# Patient Record
Sex: FEMALE | Race: BLACK OR AFRICAN AMERICAN | NOT HISPANIC OR LATINO | URBAN - METROPOLITAN AREA
[De-identification: names, ages, dates, MRNs, and addresses within clinical notes are randomized per-mention and may not be internally consistent; named-entity substitution may affect disease eponyms.]

---

## 2017-03-24 PROBLEM — D50.9 IRON DEFICIENCY ANEMIA: Status: ACTIVE | Noted: 2017-03-24

## 2017-03-28 ENCOUNTER — CONSULT (OUTPATIENT)
Dept: ONCOLOGY | Facility: CLINIC | Age: 37
End: 2017-03-28

## 2017-03-28 VITALS
BODY MASS INDEX: 28.77 KG/M2 | SYSTOLIC BLOOD PRESSURE: 108 MMHG | TEMPERATURE: 97.7 F | HEART RATE: 79 BPM | WEIGHT: 179 LBS | DIASTOLIC BLOOD PRESSURE: 69 MMHG | HEIGHT: 66 IN | RESPIRATION RATE: 16 BRPM

## 2017-03-28 DIAGNOSIS — D50.9 IRON DEFICIENCY ANEMIA, UNSPECIFIED IRON DEFICIENCY ANEMIA TYPE: Primary | ICD-10-CM

## 2017-03-28 PROBLEM — K90.9 MALABSORPTION OF IRON: Status: ACTIVE | Noted: 2017-03-28

## 2017-03-28 PROCEDURE — 99204 OFFICE O/P NEW MOD 45 MIN: CPT | Performed by: INTERNAL MEDICINE

## 2017-03-28 RX ORDER — HYDROCHLOROTHIAZIDE 25 MG/1
25 TABLET ORAL DAILY
COMMUNITY
End: 2022-04-21 | Stop reason: HOSPADM

## 2017-03-28 RX ORDER — LISINOPRIL 10 MG/1
10 TABLET ORAL DAILY
COMMUNITY
End: 2022-04-21 | Stop reason: HOSPADM

## 2017-03-28 NOTE — PROGRESS NOTES
Subjective     PROBLEM LIST:  1. Iron deficiency anemia  2. hyepertension  3. asthma    CHIEF COMPLAINT: iron deficiency anemia      HISTORY OF PRESENT ILLNESS:  The patient is a 36 y.o. year old female, referred for evaluation of iron deficiency.  She says she has been anemic for 20 years.  It was an issue with all 3 of her pregnancies.  However, recently it seems to be worsening.  She reports fatigue, craving ice, and weird food cravings.  In 2015 she had her lap band removed and at the time of the hospital stay had 2 doses of IV iron.  Her symptoms all resolved for about a year.  At the beginning of  her symptoms began to return.  She denies any blood in her bowel movements.  She has tried taking oral iron in the past but has severe constipation.  She has regular periods which she does not describe as heavy.  She had an endometrial ablation in 2015 but did not have any sensation of her periods without procedure.  Her bleeding may be slightly lighter since then.  She does not have a family history of colon cancer.    REVIEW OF SYSTEMS:  A 14 point review of systems was performed and is negative except as noted above.    No past medical history on file.    GYN History: , s/p endometrial ablation     No current outpatient prescriptions on file prior to visit.     No current facility-administered medications on file prior to visit.        Allergies   Allergen Reactions   • Latex        Past Surgical History:   Procedure Laterality Date   • BARIATRIC SURGERY  2008    [place]   • BARIATRIC SURGERY  2015    Lapband removal   •  SECTION  2011    [place], [place]   • ENDOMETRIAL ABLATION  2015    [facility]   • LEEP      [place], [place]       Social History     Social History   • Marital status:      Spouse name: N/A   • Number of children: N/A   • Years of education: N/A     Social History Main Topics   • Smoking status: Never Smoker   • Smokeless tobacco: Never Used   •  "Alcohol use No   • Drug use: Yes     Special: Marijuana      Comment: twice ever- quit in 3/2017   • Sexual activity: Not Asked     Other Topics Concern   • None     Social History Narrative   • None    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  She lives with her  and 2 children who are at home, and one grown child.    Family History   Problem Relation Age of Onset   • Diabetes Mother    • Heart disease Father    • Cancer Maternal Grandmother    • Pancreatic cancer Paternal Uncle        Objective     /69  Pulse 79  Temp 97.7 °F (36.5 °C) (Temporal Artery )   Resp 16  Ht 66\" (167.6 cm)  Wt 179 lb (81.2 kg)  BMI 28.89 kg/m2  Performance Status: 0  General: well appearing female in no acute distress  Neuro: alert and oriented  HEENT: sclera anicteric, oropharynx clear  Lymphatics: no cervical, supraclavicular, or axillary adenopathy  Cardiovascular: regular rate and rhythm, no murmurs  Lungs: clear to auscultation bilaterally  Abdomen: soft, nontender, nondistended.  No palpable organomegaly  Extremeties: no lower extremity edema  Skin: no rashes, lesions, bruising, or petechiae  Psych: mood and affect appropriate      Labs: On 2/6/2017 hemoglobin is 10.6, MCV 82, ferritin 7, iron 24, iron saturation 5%.      Assessment/Plan     Lidia Catalan is a 36 y.o. year old female with iron deficiency anemia.  This appears to be a chronic issue.  Other than menstrual bleeding there is no obvious source of her iron deficiency.  She does not have any symptoms that suggest to celiac disease or inflammatory bowel disease.  She has never had any GI endoscopic evaluation.  Since her menstrual bleeding is fairly light and does not seem to be adequate explanation for her iron deficiency on going to refer her to gastroenterology.    She does not tolerate oral iron and does not have an adequate response to oral iron.  I recommend that we give Feraheme to increase her iron stores.  " We will set this up for next week.  I will see her back in mid May to recheck her labs.           Mckenzie Arenas MD    3/28/2017

## 2017-03-31 DIAGNOSIS — D50.9 IRON DEFICIENCY ANEMIA, UNSPECIFIED IRON DEFICIENCY ANEMIA TYPE: ICD-10-CM

## 2017-03-31 RX ORDER — SODIUM CHLORIDE 9 MG/ML
250 INJECTION, SOLUTION INTRAVENOUS ONCE
Status: CANCELLED | OUTPATIENT
Start: 2017-04-04

## 2017-03-31 RX ORDER — SODIUM CHLORIDE 9 MG/ML
250 INJECTION, SOLUTION INTRAVENOUS ONCE
Status: CANCELLED | OUTPATIENT
Start: 2017-04-18

## 2017-04-10 ENCOUNTER — APPOINTMENT (OUTPATIENT)
Dept: ONCOLOGY | Facility: HOSPITAL | Age: 37
End: 2017-04-10

## 2017-04-17 ENCOUNTER — INFUSION (OUTPATIENT)
Dept: ONCOLOGY | Facility: HOSPITAL | Age: 37
End: 2017-04-17

## 2017-04-17 VITALS
RESPIRATION RATE: 20 BRPM | TEMPERATURE: 97.8 F | DIASTOLIC BLOOD PRESSURE: 65 MMHG | SYSTOLIC BLOOD PRESSURE: 106 MMHG | HEART RATE: 86 BPM

## 2017-04-17 DIAGNOSIS — D50.9 IRON DEFICIENCY ANEMIA, UNSPECIFIED IRON DEFICIENCY ANEMIA TYPE: Primary | ICD-10-CM

## 2017-04-17 PROCEDURE — 25010000002 FERUMOXYTOL 510 MG/17ML SOLUTION 510 MG VIAL: Performed by: INTERNAL MEDICINE

## 2017-04-17 PROCEDURE — 96374 THER/PROPH/DIAG INJ IV PUSH: CPT

## 2017-04-17 RX ORDER — SODIUM CHLORIDE 9 MG/ML
250 INJECTION, SOLUTION INTRAVENOUS ONCE
Status: COMPLETED | OUTPATIENT
Start: 2017-04-17 | End: 2017-04-17

## 2017-04-17 RX ADMIN — SODIUM CHLORIDE 250 ML: 9 INJECTION, SOLUTION INTRAVENOUS at 15:01

## 2017-04-17 RX ADMIN — FERUMOXYTOL 510 MG: 510 INJECTION INTRAVENOUS at 15:01

## 2017-04-24 ENCOUNTER — INFUSION (OUTPATIENT)
Dept: ONCOLOGY | Facility: HOSPITAL | Age: 37
End: 2017-04-24

## 2017-04-24 VITALS
RESPIRATION RATE: 16 BRPM | TEMPERATURE: 98.5 F | HEART RATE: 83 BPM | DIASTOLIC BLOOD PRESSURE: 64 MMHG | SYSTOLIC BLOOD PRESSURE: 113 MMHG

## 2017-04-24 DIAGNOSIS — D50.9 IRON DEFICIENCY ANEMIA, UNSPECIFIED IRON DEFICIENCY ANEMIA TYPE: Primary | ICD-10-CM

## 2017-04-24 PROCEDURE — 25010000002 FERUMOXYTOL 510 MG/17ML SOLUTION 510 MG VIAL: Performed by: INTERNAL MEDICINE

## 2017-04-24 PROCEDURE — 96374 THER/PROPH/DIAG INJ IV PUSH: CPT

## 2017-04-24 RX ADMIN — FERUMOXYTOL 510 MG: 510 INJECTION INTRAVENOUS at 14:31

## 2017-07-24 ENCOUNTER — APPOINTMENT (OUTPATIENT)
Dept: LAB | Facility: HOSPITAL | Age: 37
End: 2017-07-24

## 2017-07-24 ENCOUNTER — OFFICE VISIT (OUTPATIENT)
Dept: GASTROENTEROLOGY | Facility: CLINIC | Age: 37
End: 2017-07-24

## 2017-07-24 VITALS
TEMPERATURE: 97.1 F | BODY MASS INDEX: 30.79 KG/M2 | DIASTOLIC BLOOD PRESSURE: 88 MMHG | WEIGHT: 191.6 LBS | HEIGHT: 66 IN | HEART RATE: 72 BPM | OXYGEN SATURATION: 96 % | SYSTOLIC BLOOD PRESSURE: 120 MMHG

## 2017-07-24 DIAGNOSIS — E66.9 OBESITY, CLASS I, BMI 30-34.9: ICD-10-CM

## 2017-07-24 DIAGNOSIS — R79.89 LFT ELEVATION: ICD-10-CM

## 2017-07-24 DIAGNOSIS — D64.9 ANEMIA, UNSPECIFIED: Primary | ICD-10-CM

## 2017-07-24 LAB
BASOPHILS # BLD AUTO: 0.03 10*3/MM3 (ref 0–0.2)
BASOPHILS NFR BLD AUTO: 0.6 % (ref 0–1)
DEPRECATED RDW RBC AUTO: 43.3 FL (ref 37–54)
EOSINOPHIL # BLD AUTO: 0.07 10*3/MM3 (ref 0–0.3)
EOSINOPHIL NFR BLD AUTO: 1.4 % (ref 0–3)
ERYTHROCYTE [DISTWIDTH] IN BLOOD BY AUTOMATED COUNT: 13.4 % (ref 11.3–14.5)
FERRITIN SERPL-MCNC: 185 NG/ML (ref 10–291)
HCT VFR BLD AUTO: 41.1 % (ref 34.5–44)
HGB BLD-MCNC: 13.4 G/DL (ref 11.5–15.5)
IMM GRANULOCYTES # BLD: 0.01 10*3/MM3 (ref 0–0.03)
IMM GRANULOCYTES NFR BLD: 0.2 % (ref 0–0.6)
IRON 24H UR-MRATE: 96 MCG/DL (ref 50–175)
IRON 24H UR-MRATE: 97 MCG/DL (ref 50–175)
IRON SATN MFR SERPL: 30 % (ref 15–50)
IRON SATN SFR SERPL: 30.22 % (ref 15–50)
LYMPHOCYTES # BLD AUTO: 1.84 10*3/MM3 (ref 0.6–4.8)
LYMPHOCYTES NFR BLD AUTO: 37.7 % (ref 24–44)
MCH RBC QN AUTO: 28.7 PG (ref 27–31)
MCHC RBC AUTO-ENTMCNC: 32.6 G/DL (ref 32–36)
MCV RBC AUTO: 88 FL (ref 80–99)
MONOCYTES # BLD AUTO: 0.49 10*3/MM3 (ref 0–1)
MONOCYTES NFR BLD AUTO: 10 % (ref 0–12)
NEUTROPHILS # BLD AUTO: 2.44 10*3/MM3 (ref 1.5–8.3)
NEUTROPHILS NFR BLD AUTO: 50.1 % (ref 41–71)
PLATELET # BLD AUTO: 283 10*3/MM3 (ref 150–450)
PMV BLD AUTO: 10.6 FL (ref 6–12)
RBC # BLD AUTO: 4.67 10*6/MM3 (ref 3.89–5.14)
RETICS/RBC NFR AUTO: 1.26 % (ref 0.5–1.5)
TIBC SERPL-MCNC: 321 MCG/DL (ref 250–450)
TIBC SERPL-MCNC: 323 MCG/DL (ref 250–450)
WBC NRBC COR # BLD: 4.88 10*3/MM3 (ref 3.5–10.8)

## 2017-07-24 PROCEDURE — 83540 ASSAY OF IRON: CPT | Performed by: NURSE PRACTITIONER

## 2017-07-24 PROCEDURE — 80053 COMPREHEN METABOLIC PANEL: CPT | Performed by: NURSE PRACTITIONER

## 2017-07-24 PROCEDURE — 99204 OFFICE O/P NEW MOD 45 MIN: CPT | Performed by: NURSE PRACTITIONER

## 2017-07-24 PROCEDURE — 85045 AUTOMATED RETICULOCYTE COUNT: CPT | Performed by: NURSE PRACTITIONER

## 2017-07-24 PROCEDURE — 85025 COMPLETE CBC W/AUTO DIFF WBC: CPT | Performed by: NURSE PRACTITIONER

## 2017-07-24 PROCEDURE — 82728 ASSAY OF FERRITIN: CPT | Performed by: NURSE PRACTITIONER

## 2017-07-24 PROCEDURE — 36415 COLL VENOUS BLD VENIPUNCTURE: CPT | Performed by: NURSE PRACTITIONER

## 2017-07-24 PROCEDURE — 83550 IRON BINDING TEST: CPT | Performed by: NURSE PRACTITIONER

## 2017-07-24 NOTE — PROGRESS NOTES
"    OUTPATIENT NEW PATIENT NOTE  Patient: LIDIA CATALAN : 1980  Date of Service: 2017  Dear Dr.Amy Arenas and Ibeth Ruvalcaba MD   Thank you for your referral of this patient for evaluation of Anemia  CC: Anemia  Assessment/Plan                                             ASSESSMENT & PLANS     Anemia, chronic microcytic (borderline) Iron deficiency anemia s/p IV iron replacement 2017.  No gross evidence of bleeding. No prior PRBC transfusion.  Retic Production Index low at 0.62. Anemia r/t chronic, slow blood loss vs celiac disease vs poor iron production from the bone marrow vs hemolysis versus thalassemia sickle cell.  Mild AST/ALT elevation  Obesity, Class I, BMI 30-34.9  -     CBC Auto Differential  -     Iron Profile  -     Ferritin  -     Transferrin Saturation  -     Reticulocytes  -     CMP  -     Peripheral Blood Smear and celiac panel [Not done as ordered d/t unclear reason.  Will have pt back to get labs done if unable to add on]  -     Schedule for colonoscopy since no prior lower GI tract has been evaluated for potential source of anemia.  Depending on findings of colonoscopy, patient may need additional evaluation with pill camera.    · Continue to follow up with hematologist for further evaluation of anemia        DISCUSSION: The above plan was delineated in details with patient and all questions and concerns were answered.  Patient is also given contact information.  Patient is to return as scheduled or sooner if new problems arise  Subjective                                                     SUBJECTIVE   History of Present Illness  Ms. Lidia Catalan is a 36 y.o. female presents to the clinic today for an evaluation of Anemia.  Pt reports of being \"anemic all my life,\" even before the gastric band.  It was an issue with all 3 of her pregnancies. Pt denies menorrhagia. Reports of having regular menstruation w/o heavy bleeding or irregular menstrual cycle.  Pt " even underwent an endometrial ablation for presumption of anemia was r/t menstruation. Anemia persists despite ablation. No prior blood transfusion.  She reports of taking daily MVI, but admits of being very compliant.     In 12/2015, due to having erosion of gastric band, patient underwent gastric band removal by Dr. Brenden Azul in December 2015.  Per procedure report, no endoscopic evidence of upper bleeding. At the time of the hospital stay had 1 round of IV iron (2 doses). She has tried taking oral iron in the past but has severe constipation. Her fatigue symptoms all resolved for about a year.  At the beginning of 2017 her symptoms began to return. She also has craving for ice and weird food cravings. She was then evaluated by Dr Mckenzie Arenas, hematologist, who ordered for another round of IV iron replacement (2 doses), which pt received in 4/2017. No repeat blood work done yet since recent IV iron infusions. Dr Mckenzie Arenas also referred pt her for further evaluation.     Pt denies hematemesis, rectal bleeding, melena, gum bleed, hemoptysis, or gross hematuria.  Pt denies SOB, chest pain, dizziness, vertigo, syncope, fall, or near fall. Pt denies anorexia, nausea, vomiting, dysphagia, odynophagia, heartburn, reflux, regurgitation, early satiety.  Pt has good appetite. No abdominal pain. No change in bowel habits. Pt denies dark black stools or bright red blood in the stools, in the toilet bowl, or on the toilet tissue.  No diarrhea or constipation.  Stool character and consistency have been normal.    ROS:Review of Systems   Constitutional: Positive for fatigue. Negative for activity change, appetite change, fever and unexpected weight change.   HENT: Negative for hearing loss, trouble swallowing and voice change.    Eyes: Negative for visual disturbance.   Respiratory: Negative for cough, choking, chest tightness and shortness of breath.    Cardiovascular: Negative for chest pain.   Gastrointestinal: Negative for  abdominal distention, abdominal pain, anal bleeding, blood in stool, constipation, diarrhea, nausea, rectal pain and vomiting.        Heartburn- mild   Endocrine: Negative for polydipsia and polyphagia.   Genitourinary: Negative.    Musculoskeletal: Negative for gait problem and joint swelling.   Skin: Negative for color change and rash.   Allergic/Immunologic: Negative for food allergies.   Neurological: Negative for dizziness, seizures and speech difficulty.   Hematological: Negative for adenopathy.   Psychiatric/Behavioral: Negative for confusion.     PAST MED HX: Pt  has a past medical history of Hypertension.  PAST SURG HX: Pt  has a past surgical history that includes LEEP (); Bariatric Surgery (2008);  section, classic (2011); Endometrial ablation (2015); and Bariatric Surgery (2015).  FAM HX: family history includes lung Cancer in her maternal grandmother; Diabetes in her mother; Heart disease in her father; Pancreatic cancer in her paternal uncle.  SOC HX: Pt  reports that she has never smoked. She has never used smokeless tobacco. She reports that she uses illicit drugs, including Marijuana. 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  She lives with her  and 2 children who are at home, and one grown child  Objective                                                           OBJECTIVE   Allergy: Pt is allergic to latex.  MEDS: •  hydrochlorothiazide (HYDRODIURIL) 25 MG tablet, Take 25 mg by mouth Daily., Disp: , Rfl:   •  lisinopril (PRINIVIL,ZESTRIL) 10 MG tablet, Take 10 mg by mouth Daily., Disp: , Rfl:   •  sertraline (ZOLOFT) 50 MG tablet, Take 50 mg by mouth Daily., Disp: , Rfl:      Lab Results   Component Value Date     2015    K 3.9 2015     2015    CO2 26 2015    BUN 9 2015    CREATININE 0.9 2015    GLUCOSE 81 2015    CALCIUM 8.3 (L) 2015     ANIONGAP 8 12/20/2015     Lab Results   Component Value Date    AST 35 (H) 12/20/2015    AST 74 (H) 12/19/2015    AST 17 08/31/2015    ALT 26 12/20/2015    ALT 46 (H) 12/19/2015    ALT 11 08/31/2015    ALKPHOS 76 12/20/2015    ALKPHOS 72 12/19/2015    ALKPHOS 76 08/31/2015    BILITOT 0.9 12/20/2015    PROTEINTOT 7.0 12/20/2015    ALBUMIN 3.2 12/20/2015       Anemia Profile   Per outside medical records from [facility] at [place]:     Blood work on 2/6/17: H&H 10.6/33.0 MCV 82.5 MCHC 32.1 Ferritin 7 total iron 24 TIBC 455 iron saturation at 4% WBC 4.2  ANC 2.0. Serum creatinine 1.03 BUN 15 calcium 8.9    Lab Results   Component Value Date    HGB 13.4 07/24/2017    HGB 8.0 (L) 12/20/2015    HGB 9.4 (L) 12/19/2015    HGB 9.1 (L) 12/15/2015    HGB 9.5 (L) 11/30/2015    HGB 9.3 (L) 10/20/2015    HGB 9.4 (L) 08/31/2015     Lab Results   Component Value Date    HCT 41.1 07/24/2017    HCT 25.8 (L) 12/20/2015    HCT 30.7 (L) 12/19/2015    HCT 29.5 (L) 12/15/2015    HCT 30.2 (L) 11/30/2015    HCT 31.2 (L) 08/31/2015     Lab Results   Component Value Date    MCV 88.0 07/24/2017    MCV 71.5 (L) 12/20/2015    MCV 71.7 (L) 12/19/2015    MCHC 32.6 07/24/2017    MCHC 31.0 (L) 12/20/2015    MCHC 30.6 (L) 12/19/2015    RDW 13.4 07/24/2017     Lab Results   Component Value Date    WBC 4.88 07/24/2017    WBC 12.87 (H) 12/20/2015    WBC 21.32 (H) 12/19/2015     07/24/2017     12/20/2015     12/19/2015     Lab Results   Component Value Date    INR 0.98 12/19/2015     TIBC (Normal 250 - 450 ug/dL)  Lab Results   Component Value Date    TIBC 323 07/24/2017    TIBC 321 07/24/2017     Serum Iron (Normal 38 - 169 ug/dL)  Lab Results   Component Value Date    IRON 96 07/24/2017    IRON 97 07/24/2017    IRON 12 (L) 12/19/2015     Transferrin or Iron Saturation % (Normal 20 - 50 %)  Lab Results   Component Value Date    LABIRON 30 07/24/2017     Ferritin (Normal 20.00 - 291.00 ng/mL)  Lab Results    Component Value Date    FERRITIN 185.00 07/24/2017     Reticulocytes (Normal 0.50 - 1.50 %)  Lab Results   Component Value Date    RETICCTPCT 1.26 07/24/2017     Wt Readings from Last 5 Encounters:   07/24/17 191 lb 9.6 oz (86.9 kg)   03/28/17 179 lb (81.2 kg)   body mass index is 30.93 kg/(m^2).,Temp: 97.1 °F (36.2 °C),BP: 120/88,Heart Rate: 72   Physical Exam  General Well developed; well nourished; no acute distress.   ENT Oral mucosa pink and moist without thrush or lesions.    Neck Neck supple; trachea midline. No thyromegaly   Resp CTA; no rhonchi, rales, or wheezes.  Respiration effort normal  CV RRR; normal S1, S2; no M/R/G. No lower extremity edema  GI Abd soft, NT, ND, normal active bowel sounds.  No HSM.  No abd hernia  Skin No rash; no lesions; no bruises.  Skin turgor normal  Musc No clubbing; no cyanosis.  Gait steady  Psych Oriented to time, place, and person.  Appropriate affect  Thank you kindly for allowing me to be part of this patient’s care.    Pt care team: Nancy SANTIAGO & Ze Beaul[facility]--Gastroenterology  [phone]    CC: Dr.Diana LENIN Ruvalcaba MD  [address] [place] [address] FAX:[phone]

## 2017-07-26 ENCOUNTER — TELEPHONE (OUTPATIENT)
Dept: GASTROENTEROLOGY | Facility: CLINIC | Age: 37
End: 2017-07-26

## 2017-07-26 LAB
ALBUMIN SERPL-MCNC: 4.5 G/DL (ref 3.2–4.8)
ALBUMIN/GLOB SERPL: 1.3 G/DL (ref 1.5–2.5)
ALP SERPL-CCNC: 78 U/L (ref 25–100)
ALT SERPL W P-5'-P-CCNC: 17 U/L (ref 7–40)
ANION GAP SERPL CALCULATED.3IONS-SCNC: 14 MMOL/L (ref 3–11)
AST SERPL-CCNC: 21 U/L (ref 0–33)
BILIRUB SERPL-MCNC: 0.8 MG/DL (ref 0.3–1.2)
BUN BLD-MCNC: 26 MG/DL (ref 9–23)
BUN/CREAT SERPL: 21.7 (ref 7–25)
CALCIUM SPEC-SCNC: 9.7 MG/DL (ref 8.7–10.4)
CHLORIDE SERPL-SCNC: 109 MMOL/L (ref 99–109)
CO2 SERPL-SCNC: 19 MMOL/L (ref 20–31)
CREAT BLD-MCNC: 1.2 MG/DL (ref 0.6–1.3)
GFR SERPL CREATININE-BSD FRML MDRD: 61 ML/MIN/1.73
GLOBULIN UR ELPH-MCNC: 3.5 GM/DL
GLUCOSE BLD-MCNC: 98 MG/DL (ref 70–100)
POTASSIUM BLD-SCNC: 4.4 MMOL/L (ref 3.5–5.5)
PROT SERPL-MCNC: 8 G/DL (ref 5.7–8.2)
SODIUM BLD-SCNC: 142 MMOL/L (ref 132–146)

## 2017-07-26 NOTE — TELEPHONE ENCOUNTER
Call placed to pt.  No answer left voice mail.      Blood work shows improvement of anemia and iron.  Discussed w/ Dr Peña.  Proceed lower GI evaluation w/ colonoscopy as previously planned.

## 2017-08-16 ENCOUNTER — TELEPHONE (OUTPATIENT)
Dept: GASTROENTEROLOGY | Facility: CLINIC | Age: 37
End: 2017-08-16

## 2018-10-11 ENCOUNTER — TRANSCRIBE ORDERS (OUTPATIENT)
Dept: ADMINISTRATIVE | Facility: HOSPITAL | Age: 38
End: 2018-10-11

## 2018-10-11 DIAGNOSIS — Z12.31 VISIT FOR SCREENING MAMMOGRAM: Primary | ICD-10-CM

## 2018-11-06 ENCOUNTER — HOSPITAL ENCOUNTER (OUTPATIENT)
Dept: MAMMOGRAPHY | Facility: HOSPITAL | Age: 38
Discharge: HOME | End: 2018-11-06
Attending: FAMILY MEDICINE | Admitting: FAMILY MEDICINE

## 2018-11-06 DIAGNOSIS — Z12.31 VISIT FOR SCREENING MAMMOGRAM: ICD-10-CM

## 2018-11-06 PROCEDURE — 77067 SCR MAMMO BI INCL CAD: CPT

## 2018-11-06 PROCEDURE — 77063 BREAST TOMOSYNTHESIS BI: CPT | Performed by: RADIOLOGY

## 2018-11-06 PROCEDURE — 77063 BREAST TOMOSYNTHESIS BI: CPT

## 2018-11-06 PROCEDURE — 77067 SCR MAMMO BI INCL CAD: CPT | Performed by: RADIOLOGY

## 2018-12-11 ENCOUNTER — APPOINTMENT (OUTPATIENT)
Dept: MAMMOGRAPHY | Facility: HOSPITAL | Age: 38
End: 2018-12-11

## 2019-09-04 ENCOUNTER — TRANSCRIBE ORDERS (OUTPATIENT)
Dept: ADMINISTRATIVE | Facility: HOSPITAL | Age: 39
End: 2019-09-04

## 2019-10-08 ENCOUNTER — TRANSCRIBE ORDERS (OUTPATIENT)
Dept: LAB | Facility: HOSPITAL | Age: 39
End: 2019-10-08

## 2019-10-08 ENCOUNTER — LAB (OUTPATIENT)
Dept: LAB | Facility: HOSPITAL | Age: 39
End: 2019-10-08

## 2019-10-08 DIAGNOSIS — Z11.3 SCREENING EXAMINATION FOR VENEREAL DISEASE: Primary | ICD-10-CM

## 2019-10-08 DIAGNOSIS — Z11.3 SCREENING EXAMINATION FOR VENEREAL DISEASE: ICD-10-CM

## 2019-10-08 PROCEDURE — 87340 HEPATITIS B SURFACE AG IA: CPT

## 2019-10-08 PROCEDURE — G0432 EIA HIV-1/HIV-2 SCREEN: HCPCS | Performed by: PHYSICIAN ASSISTANT

## 2019-10-08 PROCEDURE — 36415 COLL VENOUS BLD VENIPUNCTURE: CPT

## 2019-10-08 PROCEDURE — 86803 HEPATITIS C AB TEST: CPT | Performed by: PHYSICIAN ASSISTANT

## 2019-10-08 PROCEDURE — 86592 SYPHILIS TEST NON-TREP QUAL: CPT

## 2019-10-09 LAB
HBV SURFACE AG SERPL QL IA: NORMAL
HCV AB SER DONR QL: NORMAL
HIV1+2 AB SER QL: NORMAL

## 2019-10-10 LAB — RPR SER QL: NORMAL

## 2019-11-22 ENCOUNTER — HOSPITAL ENCOUNTER (OUTPATIENT)
Dept: MAMMOGRAPHY | Facility: HOSPITAL | Age: 39
Discharge: HOME | End: 2019-11-22
Admitting: RADIOLOGY

## 2019-11-22 DIAGNOSIS — R92.8 ABNORMAL MAMMOGRAM: ICD-10-CM

## 2019-11-22 PROCEDURE — 77062 BREAST TOMOSYNTHESIS BI: CPT | Performed by: RADIOLOGY

## 2019-11-22 PROCEDURE — 77066 DX MAMMO INCL CAD BI: CPT

## 2019-11-22 PROCEDURE — 77066 DX MAMMO INCL CAD BI: CPT | Performed by: RADIOLOGY

## 2019-11-22 PROCEDURE — G0279 TOMOSYNTHESIS, MAMMO: HCPCS

## 2022-04-19 ENCOUNTER — HOSPITAL ENCOUNTER (OUTPATIENT)
Facility: HOSPITAL | Age: 42
Discharge: HOME | End: 2022-04-21
Attending: EMERGENCY MEDICINE | Admitting: INTERNAL MEDICINE

## 2022-04-19 ENCOUNTER — APPOINTMENT (OUTPATIENT)
Dept: GENERAL RADIOLOGY | Facility: HOSPITAL | Age: 42
End: 2022-04-19

## 2022-04-19 DIAGNOSIS — I21.4 NSTEMI (NON-ST ELEVATED MYOCARDIAL INFARCTION): Primary | ICD-10-CM

## 2022-04-19 PROBLEM — R79.89 ELEVATED SERUM CREATININE: Chronic | Status: ACTIVE | Noted: 2022-04-19

## 2022-04-19 PROBLEM — I10 ESSENTIAL HYPERTENSION: Chronic | Status: ACTIVE | Noted: 2022-04-19

## 2022-04-19 PROBLEM — R77.8 ELEVATED TROPONIN: Status: ACTIVE | Noted: 2022-04-19

## 2022-04-19 LAB
ALBUMIN SERPL-MCNC: 4.1 G/DL (ref 3.5–5.2)
ALBUMIN/GLOB SERPL: 1.1 G/DL
ALP SERPL-CCNC: 95 U/L (ref 39–117)
ALT SERPL W P-5'-P-CCNC: 12 U/L (ref 1–33)
ANION GAP SERPL CALCULATED.3IONS-SCNC: 11.4 MMOL/L (ref 5–15)
AST SERPL-CCNC: 23 U/L (ref 1–32)
B-HCG UR QL: NEGATIVE
BASOPHILS # BLD AUTO: 0.01 10*3/MM3 (ref 0–0.2)
BASOPHILS NFR BLD AUTO: 0.2 % (ref 0–1.5)
BILIRUB SERPL-MCNC: 0.4 MG/DL (ref 0–1.2)
BILIRUB UR QL STRIP: NEGATIVE
BUN SERPL-MCNC: 9 MG/DL (ref 6–20)
BUN/CREAT SERPL: 7.5 (ref 7–25)
CALCIUM SPEC-SCNC: 9.1 MG/DL (ref 8.6–10.5)
CHLORIDE SERPL-SCNC: 99 MMOL/L (ref 98–107)
CLARITY UR: CLEAR
CO2 SERPL-SCNC: 26.6 MMOL/L (ref 22–29)
COLOR UR: YELLOW
CREAT SERPL-MCNC: 1.2 MG/DL (ref 0.57–1)
DEPRECATED RDW RBC AUTO: 40.2 FL (ref 37–54)
EGFRCR SERPLBLD CKD-EPI 2021: 57.7 ML/MIN/1.73
EOSINOPHIL # BLD AUTO: 0.05 10*3/MM3 (ref 0–0.4)
EOSINOPHIL NFR BLD AUTO: 1 % (ref 0.3–6.2)
ERYTHROCYTE [DISTWIDTH] IN BLOOD BY AUTOMATED COUNT: 13.1 % (ref 12.3–15.4)
GLOBULIN UR ELPH-MCNC: 3.6 GM/DL
GLUCOSE SERPL-MCNC: 114 MG/DL (ref 65–99)
GLUCOSE UR STRIP-MCNC: NEGATIVE MG/DL
HCT VFR BLD AUTO: 39.1 % (ref 34–46.6)
HGB BLD-MCNC: 13 G/DL (ref 12–15.9)
HGB UR QL STRIP.AUTO: NEGATIVE
IMM GRANULOCYTES # BLD AUTO: 0.01 10*3/MM3 (ref 0–0.05)
IMM GRANULOCYTES NFR BLD AUTO: 0.2 % (ref 0–0.5)
KETONES UR QL STRIP: ABNORMAL
LEUKOCYTE ESTERASE UR QL STRIP.AUTO: NEGATIVE
LYMPHOCYTES # BLD AUTO: 0.81 10*3/MM3 (ref 0.7–3.1)
LYMPHOCYTES NFR BLD AUTO: 16.9 % (ref 19.6–45.3)
MCH RBC QN AUTO: 28.3 PG (ref 26.6–33)
MCHC RBC AUTO-ENTMCNC: 33.2 G/DL (ref 31.5–35.7)
MCV RBC AUTO: 85.2 FL (ref 79–97)
MONOCYTES # BLD AUTO: 0.34 10*3/MM3 (ref 0.1–0.9)
MONOCYTES NFR BLD AUTO: 7.1 % (ref 5–12)
NEUTROPHILS NFR BLD AUTO: 3.56 10*3/MM3 (ref 1.7–7)
NEUTROPHILS NFR BLD AUTO: 74.6 % (ref 42.7–76)
NITRITE UR QL STRIP: NEGATIVE
NRBC BLD AUTO-RTO: 0 /100 WBC (ref 0–0.2)
NT-PROBNP SERPL-MCNC: 749.6 PG/ML (ref 0–450)
PH UR STRIP.AUTO: 7.5 [PH] (ref 5–8)
PLATELET # BLD AUTO: 308 10*3/MM3 (ref 140–450)
PMV BLD AUTO: 10.7 FL (ref 6–12)
POTASSIUM SERPL-SCNC: 3.4 MMOL/L (ref 3.5–5.2)
PROT SERPL-MCNC: 7.7 G/DL (ref 6–8.5)
PROT UR QL STRIP: NEGATIVE
RBC # BLD AUTO: 4.59 10*6/MM3 (ref 3.77–5.28)
SODIUM SERPL-SCNC: 137 MMOL/L (ref 136–145)
SP GR UR STRIP: 1.01 (ref 1–1.03)
TROPONIN T SERPL-MCNC: 0.33 NG/ML (ref 0–0.03)
UROBILINOGEN UR QL STRIP: ABNORMAL
WBC NRBC COR # BLD: 4.78 10*3/MM3 (ref 3.4–10.8)

## 2022-04-19 PROCEDURE — 96372 THER/PROPH/DIAG INJ SC/IM: CPT

## 2022-04-19 PROCEDURE — 81025 URINE PREGNANCY TEST: CPT | Performed by: PHYSICIAN ASSISTANT

## 2022-04-19 PROCEDURE — 81003 URINALYSIS AUTO W/O SCOPE: CPT | Performed by: PHYSICIAN ASSISTANT

## 2022-04-19 PROCEDURE — 84484 ASSAY OF TROPONIN QUANT: CPT | Performed by: PHYSICIAN ASSISTANT

## 2022-04-19 PROCEDURE — 25010000002 MORPHINE SULFATE (PF) 2 MG/ML SOLUTION: Performed by: EMERGENCY MEDICINE

## 2022-04-19 PROCEDURE — 25010000002 ONDANSETRON PER 1 MG: Performed by: EMERGENCY MEDICINE

## 2022-04-19 PROCEDURE — 85025 COMPLETE CBC W/AUTO DIFF WBC: CPT | Performed by: PHYSICIAN ASSISTANT

## 2022-04-19 PROCEDURE — 96375 TX/PRO/DX INJ NEW DRUG ADDON: CPT

## 2022-04-19 PROCEDURE — 80053 COMPREHEN METABOLIC PANEL: CPT | Performed by: PHYSICIAN ASSISTANT

## 2022-04-19 PROCEDURE — 99220 PR INITIAL OBSERVATION CARE/DAY 70 MINUTES: CPT | Performed by: FAMILY MEDICINE

## 2022-04-19 PROCEDURE — 96374 THER/PROPH/DIAG INJ IV PUSH: CPT

## 2022-04-19 PROCEDURE — 93005 ELECTROCARDIOGRAM TRACING: CPT

## 2022-04-19 PROCEDURE — G0378 HOSPITAL OBSERVATION PER HR: HCPCS

## 2022-04-19 PROCEDURE — 83880 ASSAY OF NATRIURETIC PEPTIDE: CPT | Performed by: PHYSICIAN ASSISTANT

## 2022-04-19 PROCEDURE — 71045 X-RAY EXAM CHEST 1 VIEW: CPT

## 2022-04-19 PROCEDURE — 99284 EMERGENCY DEPT VISIT MOD MDM: CPT

## 2022-04-19 PROCEDURE — 25010000002 ENOXAPARIN PER 10 MG: Performed by: PHYSICIAN ASSISTANT

## 2022-04-19 RX ORDER — LISINOPRIL 10 MG/1
10 TABLET ORAL DAILY
Status: DISCONTINUED | OUTPATIENT
Start: 2022-04-21 | End: 2022-04-21

## 2022-04-19 RX ORDER — SODIUM CHLORIDE 0.9 % (FLUSH) 0.9 %
10 SYRINGE (ML) INJECTION AS NEEDED
Status: DISCONTINUED | OUTPATIENT
Start: 2022-04-19 | End: 2022-04-21 | Stop reason: HOSPADM

## 2022-04-19 RX ORDER — OMEPRAZOLE 20 MG/1
20 CAPSULE, DELAYED RELEASE ORAL DAILY
COMMUNITY

## 2022-04-19 RX ORDER — ACETAMINOPHEN 160 MG/5ML
650 SOLUTION ORAL EVERY 4 HOURS PRN
Status: DISCONTINUED | OUTPATIENT
Start: 2022-04-19 | End: 2022-04-21 | Stop reason: HOSPADM

## 2022-04-19 RX ORDER — ASPIRIN 81 MG/1
324 TABLET, CHEWABLE ORAL ONCE
Status: COMPLETED | OUTPATIENT
Start: 2022-04-19 | End: 2022-04-19

## 2022-04-19 RX ORDER — CHOLECALCIFEROL (VITAMIN D3) 125 MCG
5 CAPSULE ORAL NIGHTLY PRN
Status: DISCONTINUED | OUTPATIENT
Start: 2022-04-19 | End: 2022-04-21 | Stop reason: HOSPADM

## 2022-04-19 RX ORDER — NALOXONE HCL 0.4 MG/ML
0.4 VIAL (ML) INJECTION
Status: DISCONTINUED | OUTPATIENT
Start: 2022-04-19 | End: 2022-04-21 | Stop reason: HOSPADM

## 2022-04-19 RX ORDER — BUPROPION HYDROCHLORIDE 150 MG/1
300 TABLET, EXTENDED RELEASE ORAL DAILY
Status: ON HOLD | COMMUNITY
End: 2022-04-20

## 2022-04-19 RX ORDER — ACETAMINOPHEN 325 MG/1
650 TABLET ORAL EVERY 4 HOURS PRN
Status: DISCONTINUED | OUTPATIENT
Start: 2022-04-19 | End: 2022-04-21 | Stop reason: HOSPADM

## 2022-04-19 RX ORDER — ATORVASTATIN CALCIUM 40 MG/1
40 TABLET, FILM COATED ORAL NIGHTLY
Status: DISCONTINUED | OUTPATIENT
Start: 2022-04-19 | End: 2022-04-21 | Stop reason: HOSPADM

## 2022-04-19 RX ORDER — SODIUM CHLORIDE 0.9 % (FLUSH) 0.9 %
10 SYRINGE (ML) INJECTION EVERY 12 HOURS SCHEDULED
Status: DISCONTINUED | OUTPATIENT
Start: 2022-04-19 | End: 2022-04-21 | Stop reason: HOSPADM

## 2022-04-19 RX ORDER — HYDROCHLOROTHIAZIDE 25 MG/1
25 TABLET ORAL DAILY
Status: DISCONTINUED | OUTPATIENT
Start: 2022-04-20 | End: 2022-04-21

## 2022-04-19 RX ORDER — MORPHINE SULFATE 2 MG/ML
2 INJECTION, SOLUTION INTRAMUSCULAR; INTRAVENOUS ONCE
Status: COMPLETED | OUTPATIENT
Start: 2022-04-19 | End: 2022-04-19

## 2022-04-19 RX ORDER — CARVEDILOL 3.12 MG/1
3.12 TABLET ORAL 2 TIMES DAILY WITH MEALS
Status: DISCONTINUED | OUTPATIENT
Start: 2022-04-19 | End: 2022-04-21 | Stop reason: HOSPADM

## 2022-04-19 RX ORDER — LISINOPRIL 10 MG/1
10 TABLET ORAL DAILY
Status: DISCONTINUED | OUTPATIENT
Start: 2022-04-20 | End: 2022-04-19

## 2022-04-19 RX ORDER — ALUMINA, MAGNESIA, AND SIMETHICONE 2400; 2400; 240 MG/30ML; MG/30ML; MG/30ML
15 SUSPENSION ORAL EVERY 6 HOURS PRN
Status: DISCONTINUED | OUTPATIENT
Start: 2022-04-19 | End: 2022-04-21 | Stop reason: HOSPADM

## 2022-04-19 RX ORDER — ONDANSETRON 2 MG/ML
4 INJECTION INTRAMUSCULAR; INTRAVENOUS EVERY 6 HOURS PRN
Status: DISCONTINUED | OUTPATIENT
Start: 2022-04-19 | End: 2022-04-21 | Stop reason: HOSPADM

## 2022-04-19 RX ORDER — BUPROPION HYDROCHLORIDE 150 MG/1
300 TABLET, EXTENDED RELEASE ORAL DAILY
Status: DISCONTINUED | OUTPATIENT
Start: 2022-04-20 | End: 2022-04-20

## 2022-04-19 RX ORDER — FLUOXETINE HYDROCHLORIDE 20 MG/1
40 CAPSULE ORAL DAILY
Status: DISCONTINUED | OUTPATIENT
Start: 2022-04-20 | End: 2022-04-21 | Stop reason: HOSPADM

## 2022-04-19 RX ORDER — HYDROXYZINE HYDROCHLORIDE 10 MG/1
10 TABLET, FILM COATED ORAL EVERY 4 HOURS PRN
COMMUNITY

## 2022-04-19 RX ORDER — ONDANSETRON 2 MG/ML
4 INJECTION INTRAMUSCULAR; INTRAVENOUS ONCE
Status: COMPLETED | OUTPATIENT
Start: 2022-04-19 | End: 2022-04-19

## 2022-04-19 RX ORDER — PANTOPRAZOLE SODIUM 40 MG/1
40 TABLET, DELAYED RELEASE ORAL EVERY MORNING
Status: DISCONTINUED | OUTPATIENT
Start: 2022-04-20 | End: 2022-04-21 | Stop reason: HOSPADM

## 2022-04-19 RX ORDER — FLUOXETINE HYDROCHLORIDE 20 MG/1
40 CAPSULE ORAL DAILY
COMMUNITY

## 2022-04-19 RX ORDER — ONDANSETRON 4 MG/1
4 TABLET, FILM COATED ORAL EVERY 6 HOURS PRN
Status: DISCONTINUED | OUTPATIENT
Start: 2022-04-19 | End: 2022-04-21 | Stop reason: HOSPADM

## 2022-04-19 RX ORDER — MORPHINE SULFATE 2 MG/ML
2 INJECTION, SOLUTION INTRAMUSCULAR; INTRAVENOUS EVERY 4 HOURS PRN
Status: ACTIVE | OUTPATIENT
Start: 2022-04-19 | End: 2022-04-20

## 2022-04-19 RX ORDER — ACETAMINOPHEN 650 MG/1
650 SUPPOSITORY RECTAL EVERY 4 HOURS PRN
Status: DISCONTINUED | OUTPATIENT
Start: 2022-04-19 | End: 2022-04-21 | Stop reason: HOSPADM

## 2022-04-19 RX ORDER — NITROGLYCERIN 0.4 MG/1
0.4 TABLET SUBLINGUAL
Status: DISCONTINUED | OUTPATIENT
Start: 2022-04-19 | End: 2022-04-21 | Stop reason: HOSPADM

## 2022-04-19 RX ADMIN — ATORVASTATIN CALCIUM 40 MG: 40 TABLET, FILM COATED ORAL at 21:03

## 2022-04-19 RX ADMIN — ENOXAPARIN SODIUM 80 MG: 100 INJECTION SUBCUTANEOUS at 19:45

## 2022-04-19 RX ADMIN — CARVEDILOL 3.12 MG: 3.12 TABLET, FILM COATED ORAL at 21:03

## 2022-04-19 RX ADMIN — ASPIRIN 324 MG: 81 TABLET, CHEWABLE ORAL at 19:45

## 2022-04-19 RX ADMIN — ONDANSETRON 4 MG: 2 INJECTION INTRAMUSCULAR; INTRAVENOUS at 18:56

## 2022-04-19 RX ADMIN — MORPHINE SULFATE 2 MG: 2 INJECTION, SOLUTION INTRAMUSCULAR; INTRAVENOUS at 18:57

## 2022-04-19 RX ADMIN — Medication 10 ML: at 21:03

## 2022-04-19 NOTE — ED PROVIDER NOTES
Subjective   Chief Complaint: Chest pain  History of Present Illness: 41-year-old female with a history of hypertension and high cholesterol comes in for evaluation of above complaint.  No prior history of CAD.  She states since Sunday she had multiple episodes of chest pain.  She states Sunday she had 3 episodes, no symptoms yesterday, Monday and today Tuesday she has had 3 episodes since 11 AM.  The pain is never gone away since 11 AM but it seems to wax and wane.  When it becomes most intense it lasts about 30 minutes.  She describes a heaviness in her central and left chest up into her neck and into her left arm.  She also at that time states she feels short of breath and very uncomfortable.  This occurs usually at rest.  No cough or recent illness.  She was supposed be taking hydrochlorothiazide twice daily but was running low so has only been taking it once daily recently.  Saw her PCP on Monday and had lisinopril added and HCTZ decreased to just once a day but get the lisinopril filled due to the pharmacy not having it.  Currently she states she has 3 or 4 out of 10 chest heaviness.  She does not smoke.  Onset: Sunday  Timing: Intermittent  Exacerbating / Alleviating factors: None  Associated symptoms: Short of breath, vomited today      Nurses Notes reviewed and agree, including vitals, allergies, social history and prior medical history.          Review of Systems   Constitutional: Negative.    HENT: Negative.    Eyes: Negative.    Respiratory: Positive for shortness of breath.    Cardiovascular: Positive for chest pain.   Gastrointestinal: Positive for vomiting.   Genitourinary: Negative.    Musculoskeletal: Negative.    Skin: Negative.    Neurological: Negative.    Psychiatric/Behavioral: Negative.        Past Medical History:   Diagnosis Date   • Hypertension        Allergies   Allergen Reactions   • Latex    • Adhesive Tape Rash       Past Surgical History:   Procedure Laterality Date   • BARIATRIC  SURGERY  2008    [place]   • BARIATRIC SURGERY  2015    Lapband removal   •  SECTION  2011    [place], [place]   • ENDOMETRIAL ABLATION  2015    [facility]   • LEEP      [place], [place]       Family History   Problem Relation Age of Onset   • Diabetes Mother    • Heart disease Father    • Cancer Maternal Grandmother         lung   • Pancreatic cancer Paternal Uncle    • Breast cancer Neg Hx    • Ovarian cancer Neg Hx        Social History     Socioeconomic History   • Marital status:    Tobacco Use   • Smoking status: Never Smoker   • Smokeless tobacco: Never Used   Substance and Sexual Activity   • Alcohol use: No   • Drug use: Yes     Types: Marijuana     Comment: twice ever- quit in 3/2017   • Sexual activity: Defer           Objective   Physical Exam  Vitals and nursing note reviewed.   Constitutional:       Appearance: She is well-developed.   HENT:      Head: Normocephalic and atraumatic.   Cardiovascular:      Rate and Rhythm: Normal rate and regular rhythm.      Heart sounds: Normal heart sounds.   Pulmonary:      Effort: Pulmonary effort is normal.      Breath sounds: Normal breath sounds. No decreased breath sounds, wheezing, rhonchi or rales.   Abdominal:      Palpations: Abdomen is soft.   Musculoskeletal:         General: Normal range of motion.   Skin:     General: Skin is warm and dry.   Neurological:      General: No focal deficit present.      Mental Status: She is alert.   Psychiatric:         Mood and Affect: Mood normal.         Behavior: Behavior normal.         Procedures           ED Course  ED Course as of 22e 2022   175 EKG interpreted by me reveals sinus rhythm at a rate of 75.  Low voltage EKG with nonspecific diffuse nonspecific T wave changes.  Anterolateral T wave inversions in leads V4 V 5. [PF]   1906 CBC & Differential(!) [TM]   1906 HCG, Urine QL: Negative [TM]   1906 Urinalysis With Microscopic If Indicated (No Culture) - Urine, Clean  Catch(!) [TM]      ED Course User Index  [PF] Raz Goodson, DO  [TM] Franko Gentile PA-C                                                 MDM  Dr. Thornton, recommends admit to hospitalist service Lovenox or heparin.  Dr. Brewer, hospitalist, graciously except the patient for admission.  Final diagnoses:   NSTEMI (non-ST elevated myocardial infarction) ([facility]ly [facility])       ED Disposition  ED Disposition     ED Disposition   Decision to Admit    Condition   --    Comment   Level of Care: Telemetry [5]   Diagnosis: NSTEMI (non-ST elevated myocardial infarction) (Former[facility]) [703130]   Admitting Physician: MARIE STARKS [[id number]]   Attending Physician: MARIE STARKS [[id number]]               No follow-up provider specified.       Medication List      No changes were made to your prescriptions during this visit.          Franko Gentile PA-C  04/19/22 1940

## 2022-04-19 NOTE — H&P
[facility]   HISTORY AND PHYSICAL      Name:  Lidia Lin   Age:  41 y.o.  Sex:  female  :  1980  MRN:  [id number]   Visit Number:  [id number]  Admission Date:  2022  Date Of Service:  22  Primary Care Physician:  Provider, No Known    Chief Complaint:     Chest pain    History Of Presenting Illness:      The patient is a 41-year-old -American female with history of hypertension, hyperlipidemia, bariatric surgery, iron deficiency anemia, gestational diabetes, who had presented to the emergency room with complaints of chest pain.  Patient states symptoms initially started on  evening.  She was on her laptop, when she felt heaviness and pressure sensation in the center of her chest, which was radiating to her back and down her left arm.  She notes this did subside, subsequent had another episode later that evening, which resolved as well.  She noted she felt fine yesterday, however today at around 2:00 she started to have more complaints of pain that was similar to , slightly worsened.  This did improve as well.  However this evening, the discomfort returned, was significantly worse, she became sweaty/diaphoretic, and felt some shortness of breath.  She denied ever feeling the symptoms in the past.  She denies any known history of cardiovascular disease.  No history of alcohol, tobacco use, admits to occasional edible marijuana use.  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  Admits to a family history with mother having kidney disease and diabetes, father having congestive heart failure.    Of note, patient did recently travel to [place] about a week ago via flight.  Denies any history of lower extremity swelling, pain, prior history of VTE.  Patient also noted that she was recently started back on lisinopril in addition to her HCTZ for her blood pressure being elevated, however has not picked up prescription for this yet from  pharmacy.    In the ER, patient was hypertensive on arrival.  Heart rate in the 75 range.  Nonhypoxic on room air and afebrile.  CMP with a creatinine of 1.2 and a potassium of 3.4.  proBNP of 749.  A troponin of 0.326.  White count 4700 hemoglobin 13 and platelets of 308.  An EKG showed a sinus rhythm, rate of 75, nonspecific T wave changes in leads V4 and V5.  Patient was given 324 mg aspirin, morphine, and started on enoxaparin.  ER discussed the patient with cardiology, recommended admission.  We are asked to admit thereafter.    Review Of Systems:    All systems were reviewed and negative except as mentioned in history of presenting illness, assessment and plan.    Past Medical History: Patient  has a past medical history of Hypertension.    Past Surgical History: Patient  has a past surgical history that includes LEEP (); Bariatric Surgery (2008);  section (2011); Endometrial ablation (2015); and Bariatric Surgery (2015).    Social History: Patient  reports that she has never smoked. She has never used smokeless tobacco. She reports current drug use. Drug: Marijuana. She reports that she does not drink alcohol.    Family History: Patient's family history includes Cancer in her maternal grandmother; Diabetes in her mother; Heart disease in her father; Pancreatic cancer in her paternal uncle.    Allergies:      Latex and Adhesive tape    Home Medications:    Prior to Admission Medications     Prescriptions Last Dose Informant Patient Reported? Taking?    buPROPion SR (WELLBUTRIN SR) 150 MG 12 hr tablet   Yes No    Take 300 mg by mouth Daily.    FLUoxetine (PROzac) 20 MG capsule   Yes No    Take 40 mg by mouth Daily.    hydrochlorothiazide (HYDRODIURIL) 25 MG tablet   Yes No    Take 25 mg by mouth Daily.    hydrOXYzine (ATARAX) 10 MG tablet   Yes No    Take 10 mg by mouth Every 4 (Four) Hours As Needed for Itching.    lisinopril (PRINIVIL,ZESTRIL) 10 MG tablet   Yes No    Take 10 mg by  "mouth Daily.    omeprazole (priLOSEC) 20 MG capsule   Yes No    Take 20 mg by mouth Daily.    sertraline (ZOLOFT) 50 MG tablet   Yes No    Take 50 mg by mouth Daily.        ED Medications:    Medications   sodium chloride 0.9 % flush 10 mL (has no administration in time range)   Morphine sulfate (PF) injection 2 mg (2 mg Intravenous Given 4/19/22 1857)   ondansetron (ZOFRAN) injection 4 mg (4 mg Intravenous Given 4/19/22 1856)   aspirin chewable tablet 324 mg (324 mg Oral Given 4/19/22 1945)   enoxaparin (LOVENOX) syringe 80 mg (80 mg Subcutaneous Given 4/19/22 1945)     Vital Signs:  Temp:  [98.2 °F (36.8 °C)] 98.2 °F (36.8 °C)  Heart Rate:  [74-80] 75  Resp:  [16-20] 16  BP: (139-163)/(100-118) 139/101        04/19/22  1656   Weight: 80.7 kg (178 lb)     Body mass index is 28.73 kg/m².    Physical Exam:     Most recent vital Signs: BP (!) 139/101   Pulse 75   Temp 98.2 °F (36.8 °C) (Oral)   Resp 16   Ht 167.6 cm (66\")   Wt 80.7 kg (178 lb)   LMP 04/11/2022 (Approximate)   SpO2 100%   BMI 28.73 kg/m²     Physical Exam  Constitutional:       General: She is not in acute distress.     Appearance: Normal appearance. She is not toxic-appearing.   HENT:      Head: Normocephalic.      Mouth/Throat:      Mouth: Mucous membranes are moist.   Eyes:      Pupils: Pupils are equal, round, and reactive to light.   Cardiovascular:      Rate and Rhythm: Normal rate and regular rhythm.      Pulses: Normal pulses.      Heart sounds:     No friction rub. No gallop.   Pulmonary:      Effort: Pulmonary effort is normal. No respiratory distress.      Breath sounds: No wheezing or rales.   Abdominal:      General: Abdomen is flat. There is no distension.      Tenderness: There is no abdominal tenderness.   Musculoskeletal:         General: Normal range of motion.      Right lower leg: No edema.      Left lower leg: No edema.   Skin:     General: Skin is warm.      Capillary Refill: Capillary refill takes less than 2 seconds.     "  Findings: No bruising or lesion.   Neurological:      General: No focal deficit present.      Mental Status: She is alert and oriented to person, place, and time.      Motor: No weakness.      Gait: Gait normal.   Psychiatric:         Mood and Affect: Mood normal.         Thought Content: Thought content normal.         Judgment: Judgment normal.         Laboratory data:    I have reviewed the labs done in the emergency room.    Results from last 7 days   Lab Units 04/19/22  1855   SODIUM mmol/L 137   POTASSIUM mmol/L 3.4*   CHLORIDE mmol/L 99   CO2 mmol/L 26.6   BUN mg/dL 9   CREATININE mg/dL 1.20*   CALCIUM mg/dL 9.1   BILIRUBIN mg/dL 0.4   ALK PHOS U/L 95   ALT (SGPT) U/L 12   AST (SGOT) U/L 23   GLUCOSE mg/dL 114*     Results from last 7 days   Lab Units 04/19/22  1855   WBC 10*3/mm3 4.78   HEMOGLOBIN g/dL 13.0   HEMATOCRIT % 39.1   PLATELETS 10*3/mm3 308         Results from last 7 days   Lab Units 04/19/22  1855   TROPONIN T ng/mL 0.326*     Results from last 7 days   Lab Units 04/19/22  1855   PROBNP pg/mL 749.6*                 Results from last 7 days   Lab Units 04/19/22  1851   COLOR UA  Yellow   GLUCOSE UA  Negative   KETONES UA  Trace*   LEUKOCYTES UA  Negative   PH, URINE  7.5   BILIRUBIN UA  Negative   UROBILINOGEN UA  1.0 E.U./dL       Pain Management Panel    There is no flowsheet data to display.         EKG:      Sinus rhythm, normal rate    Radiology:    No radiology results for the last 3 days    Assessment:    1. Chest pain with elevated troponin, concern for NSTEMI, present admission, acute  2. Uncontrolled essential hypertension  3. Elevated creatinine, appears stable from baseline 4 years ago    Plan:    Concern for NSTEMI:  Symptomatology concerning for angina.  We will treat per ACS protocol overnight after discussion with cardiology.  Continue with Lovenox.  Added carvedilol for blood pressure.  As needed morphine/nitroglycerin.  Follow-up troponins.  2D echo.  Telemetry monitoring.   N.p.o. after midnight with plans for cardiac catheterization.  Currently have low suspicion for any large PE, consider checking D-dimer with a.m. labs, will be anticoagulated regardless at this time.  Add labs for restratification for CAD.    Essential hypertension:  Added carvedilol, continue HCTZ, start lisinopril prior to discharge.  Will need to monitor renal function if receives contrast tomorrow with [facility].    Patient otherwise meets observation level care with anticipated stay less than 2 midnights.  Further plan of care be depend upon improvement clinical condition.  Plan of care was discussed with patient and family at bedside.    Risk Assessment: High  DVT Prophylaxis: Lovenox  Code Status: Full  Diet: N.p.o. midnight, cardiac otherwise    Advance Care Planning   ACP discussion was held with the patient during this visit. Patient does not have an advance directive, declines further assistance.           Andie Meraz DO  04/19/22  19:54 EDT    Dictated utilizing Dragon dictation.

## 2022-04-20 ENCOUNTER — APPOINTMENT (OUTPATIENT)
Dept: CARDIOLOGY | Facility: HOSPITAL | Age: 42
End: 2022-04-20

## 2022-04-20 LAB
BH CV ECHO LEFT VENTRICLE GLOBAL LONGITUDINAL STRAIN: -8.3 %
BH CV ECHO MEAS - AO MAX PG (FULL): 3.3 MMHG
BH CV ECHO MEAS - AO MAX PG: 5 MMHG
BH CV ECHO MEAS - AO MEAN PG (FULL): 2 MMHG
BH CV ECHO MEAS - AO MEAN PG: 3 MMHG
BH CV ECHO MEAS - AO ROOT AREA (BSA CORRECTED): 1.3
BH CV ECHO MEAS - AO ROOT AREA: 4.8 CM^2
BH CV ECHO MEAS - AO ROOT DIAM: 2.5 CM
BH CV ECHO MEAS - AO V2 MAX: 111 CM/SEC
BH CV ECHO MEAS - AO V2 MEAN: 82.6 CM/SEC
BH CV ECHO MEAS - AO V2 VTI: 21.6 CM
BH CV ECHO MEAS - AVA(I,A): 1.1 CM^2
BH CV ECHO MEAS - AVA(I,D): 1.1 CM^2
BH CV ECHO MEAS - AVA(V,A): 1.3 CM^2
BH CV ECHO MEAS - AVA(V,D): 1.3 CM^2
BH CV ECHO MEAS - BSA(HAYCOCK): 2 M^2
BH CV ECHO MEAS - BSA: 1.9 M^2
BH CV ECHO MEAS - BZI_BMI: 28.6 KILOGRAMS/M^2
BH CV ECHO MEAS - BZI_METRIC_HEIGHT: 167.6 CM
BH CV ECHO MEAS - BZI_METRIC_WEIGHT: 80.3 KG
BH CV ECHO MEAS - EDV(CUBED): 105.2 ML
BH CV ECHO MEAS - EDV(MOD-SP2): 106 ML
BH CV ECHO MEAS - EDV(MOD-SP4): 87.8 ML
BH CV ECHO MEAS - EDV(TEICH): 103.4 ML
BH CV ECHO MEAS - EF(CUBED): 44 %
BH CV ECHO MEAS - EF(MOD-BP): 45.4 %
BH CV ECHO MEAS - EF(MOD-SP2): 53.1 %
BH CV ECHO MEAS - EF(MOD-SP4): 37.4 %
BH CV ECHO MEAS - EF(TEICH): 36.6 %
BH CV ECHO MEAS - ESV(CUBED): 58.9 ML
BH CV ECHO MEAS - ESV(MOD-SP2): 49.7 ML
BH CV ECHO MEAS - ESV(MOD-SP4): 55 ML
BH CV ECHO MEAS - ESV(TEICH): 65.5 ML
BH CV ECHO MEAS - FS: 17.6 %
BH CV ECHO MEAS - IVS/LVPW: 1.1
BH CV ECHO MEAS - IVSD: 1 CM
BH CV ECHO MEAS - LA DIMENSION: 2.4 CM
BH CV ECHO MEAS - LA/AO: 0.96
BH CV ECHO MEAS - LAD MAJOR: 4.5 CM
BH CV ECHO MEAS - LAT PEAK E' VEL: 5.3 CM/SEC
BH CV ECHO MEAS - LATERAL E/E' RATIO: 11.7
BH CV ECHO MEAS - LV DIASTOLIC VOL/BSA (35-75): 46.2 ML/M^2
BH CV ECHO MEAS - LV MASS(C)D: 157.8 GRAMS
BH CV ECHO MEAS - LV MASS(C)DI: 83.1 GRAMS/M^2
BH CV ECHO MEAS - LV MAX PG: 1.7 MMHG
BH CV ECHO MEAS - LV MEAN PG: 1 MMHG
BH CV ECHO MEAS - LV SYSTOLIC VOL/BSA (12-30): 29 ML/M^2
BH CV ECHO MEAS - LV V1 MAX: 64.3 CM/SEC
BH CV ECHO MEAS - LV V1 MEAN: 39.4 CM/SEC
BH CV ECHO MEAS - LV V1 VTI: 10.9 CM
BH CV ECHO MEAS - LVIDD: 4.7 CM
BH CV ECHO MEAS - LVIDS: 3.9 CM
BH CV ECHO MEAS - LVLD AP2: 8.7 CM
BH CV ECHO MEAS - LVLD AP4: 8.3 CM
BH CV ECHO MEAS - LVLS AP2: 8.2 CM
BH CV ECHO MEAS - LVLS AP4: 7.7 CM
BH CV ECHO MEAS - LVOT AREA (M): 2.3 CM^2
BH CV ECHO MEAS - LVOT AREA: 2.3 CM^2
BH CV ECHO MEAS - LVOT DIAM: 1.7 CM
BH CV ECHO MEAS - LVPWD: 0.91 CM
BH CV ECHO MEAS - MED PEAK E' VEL: 4.9 CM/SEC
BH CV ECHO MEAS - MEDIAL E/E' RATIO: 12.7
BH CV ECHO MEAS - MV A MAX VEL: 64 CM/SEC
BH CV ECHO MEAS - MV DEC TIME: 0.07 SEC
BH CV ECHO MEAS - MV E MAX VEL: 62.1 CM/SEC
BH CV ECHO MEAS - MV E/A: 0.97
BH CV ECHO MEAS - MV MAX PG: 2.5 MMHG
BH CV ECHO MEAS - MV MEAN PG: 1 MMHG
BH CV ECHO MEAS - MV V2 MAX: 78.9 CM/SEC
BH CV ECHO MEAS - MV V2 MEAN: 56.5 CM/SEC
BH CV ECHO MEAS - MV V2 VTI: 14.7 CM
BH CV ECHO MEAS - MVA(VTI): 1.7 CM^2
BH CV ECHO MEAS - PA ACC TIME: 0.11 SEC
BH CV ECHO MEAS - PA MAX PG (FULL): 1.1 MMHG
BH CV ECHO MEAS - PA MAX PG: 2.7 MMHG
BH CV ECHO MEAS - PA MEAN PG (FULL): 0 MMHG
BH CV ECHO MEAS - PA MEAN PG: 1 MMHG
BH CV ECHO MEAS - PA PR(ACCEL): 31.3 MMHG
BH CV ECHO MEAS - PA V2 MAX: 81.9 CM/SEC
BH CV ECHO MEAS - PA V2 MEAN: 55.5 CM/SEC
BH CV ECHO MEAS - PA V2 VTI: 17.1 CM
BH CV ECHO MEAS - RV MAX PG: 1.6 MMHG
BH CV ECHO MEAS - RV MEAN PG: 1 MMHG
BH CV ECHO MEAS - RV V1 MAX: 62.8 CM/SEC
BH CV ECHO MEAS - RV V1 MEAN: 39.1 CM/SEC
BH CV ECHO MEAS - RV V1 VTI: 11.5 CM
BH CV ECHO MEAS - SI(AO): 54.9 ML/M^2
BH CV ECHO MEAS - SI(CUBED): 24.4 ML/M^2
BH CV ECHO MEAS - SI(LVOT): 13 ML/M^2
BH CV ECHO MEAS - SI(MOD-SP2): 29.6 ML/M^2
BH CV ECHO MEAS - SI(MOD-SP4): 17.3 ML/M^2
BH CV ECHO MEAS - SI(TEICH): 19.9 ML/M^2
BH CV ECHO MEAS - SV(AO): 104.3 ML
BH CV ECHO MEAS - SV(CUBED): 46.3 ML
BH CV ECHO MEAS - SV(LVOT): 24.7 ML
BH CV ECHO MEAS - SV(MOD-SP2): 56.3 ML
BH CV ECHO MEAS - SV(MOD-SP4): 32.8 ML
BH CV ECHO MEAS - SV(TEICH): 37.9 ML
BH CV ECHO MEAS - TAPSE (>1.6): 2 CM
BH CV ECHO MEASUREMENTS AVERAGE E/E' RATIO: 12.18
BH CV XLRA - RV BASE: 2.5 CM
BH CV XLRA - TDI S': 10.8 CM/SEC
CHOLEST SERPL-MCNC: 205 MG/DL (ref 0–200)
D DIMER PPP FEU-MCNC: 0.38 MCGFEU/ML (ref 0–0.57)
HBA1C MFR BLD: 5.2 % (ref 4.8–5.6)
HDLC SERPL-MCNC: 62 MG/DL (ref 40–60)
LDLC SERPL CALC-MCNC: 133 MG/DL (ref 0–100)
LDLC/HDLC SERPL: 2.12 {RATIO}
LEFT ATRIUM VOLUME INDEX: 21.2 ML/M^2
LEFT ATRIUM VOLUME: 40.2 ML
SARS-COV-2 RNA PNL SPEC NAA+PROBE: NOT DETECTED
TRIGL SERPL-MCNC: 57 MG/DL (ref 0–150)
TROPONIN T SERPL-MCNC: 0.45 NG/ML (ref 0–0.03)
TSH SERPL DL<=0.05 MIU/L-ACNC: 1.14 UIU/ML (ref 0.27–4.2)
VLDLC SERPL-MCNC: 10 MG/DL (ref 5–40)

## 2022-04-20 PROCEDURE — 84443 ASSAY THYROID STIM HORMONE: CPT | Performed by: FAMILY MEDICINE

## 2022-04-20 PROCEDURE — 93306 TTE W/DOPPLER COMPLETE: CPT

## 2022-04-20 PROCEDURE — 85379 FIBRIN DEGRADATION QUANT: CPT | Performed by: NURSE PRACTITIONER

## 2022-04-20 PROCEDURE — G0378 HOSPITAL OBSERVATION PER HR: HCPCS

## 2022-04-20 PROCEDURE — 84484 ASSAY OF TROPONIN QUANT: CPT | Performed by: FAMILY MEDICINE

## 2022-04-20 PROCEDURE — 25010000002 ENOXAPARIN PER 10 MG: Performed by: FAMILY MEDICINE

## 2022-04-20 PROCEDURE — 96372 THER/PROPH/DIAG INJ SC/IM: CPT

## 2022-04-20 PROCEDURE — 99225 PR SBSQ OBSERVATION CARE/DAY 25 MINUTES: CPT | Performed by: NURSE PRACTITIONER

## 2022-04-20 PROCEDURE — 93306 TTE W/DOPPLER COMPLETE: CPT | Performed by: INTERNAL MEDICINE

## 2022-04-20 PROCEDURE — 87635 SARS-COV-2 COVID-19 AMP PRB: CPT | Performed by: FAMILY MEDICINE

## 2022-04-20 PROCEDURE — 80061 LIPID PANEL: CPT | Performed by: FAMILY MEDICINE

## 2022-04-20 PROCEDURE — 99204 OFFICE O/P NEW MOD 45 MIN: CPT | Performed by: INTERNAL MEDICINE

## 2022-04-20 PROCEDURE — 83036 HEMOGLOBIN GLYCOSYLATED A1C: CPT | Performed by: FAMILY MEDICINE

## 2022-04-20 RX ORDER — BUPROPION HYDROCHLORIDE 150 MG/1
300 TABLET ORAL DAILY
Status: DISCONTINUED | OUTPATIENT
Start: 2022-04-21 | End: 2022-04-21 | Stop reason: SDUPTHER

## 2022-04-20 RX ORDER — SODIUM CHLORIDE 9 MG/ML
1-3 INJECTION, SOLUTION INTRAVENOUS CONTINUOUS
Status: DISCONTINUED | OUTPATIENT
Start: 2022-04-20 | End: 2022-04-21 | Stop reason: HOSPADM

## 2022-04-20 RX ORDER — HYDROXYZINE PAMOATE 25 MG/1
25 CAPSULE ORAL 4 TIMES DAILY PRN
Status: DISCONTINUED | OUTPATIENT
Start: 2022-04-20 | End: 2022-04-21 | Stop reason: HOSPADM

## 2022-04-20 RX ORDER — BUPROPION HYDROCHLORIDE 150 MG/1
300 TABLET ORAL DAILY
COMMUNITY

## 2022-04-20 RX ORDER — ASPIRIN 81 MG/1
81 TABLET ORAL DAILY
Status: DISCONTINUED | OUTPATIENT
Start: 2022-04-20 | End: 2022-04-21 | Stop reason: HOSPADM

## 2022-04-20 RX ORDER — POTASSIUM CHLORIDE 750 MG/1
40 CAPSULE, EXTENDED RELEASE ORAL ONCE
Status: COMPLETED | OUTPATIENT
Start: 2022-04-20 | End: 2022-04-20

## 2022-04-20 RX ADMIN — HYDROXYZINE PAMOATE 25 MG: 25 CAPSULE ORAL at 20:50

## 2022-04-20 RX ADMIN — Medication 10 ML: at 20:51

## 2022-04-20 RX ADMIN — ASPIRIN 81 MG: 81 TABLET, COATED ORAL at 16:10

## 2022-04-20 RX ADMIN — CARVEDILOL 3.12 MG: 3.12 TABLET, FILM COATED ORAL at 07:57

## 2022-04-20 RX ADMIN — FLUOXETINE 40 MG: 20 CAPSULE ORAL at 07:57

## 2022-04-20 RX ADMIN — HYDROCHLOROTHIAZIDE 25 MG: 25 TABLET ORAL at 08:42

## 2022-04-20 RX ADMIN — FLUOXETINE 40 MG: 20 CAPSULE ORAL at 09:41

## 2022-04-20 RX ADMIN — HYDROCHLOROTHIAZIDE 25 MG: 25 TABLET ORAL at 07:57

## 2022-04-20 RX ADMIN — PANTOPRAZOLE SODIUM 40 MG: 40 TABLET, DELAYED RELEASE ORAL at 06:19

## 2022-04-20 RX ADMIN — POTASSIUM CHLORIDE 40 MEQ: 750 CAPSULE, EXTENDED RELEASE ORAL at 16:10

## 2022-04-20 RX ADMIN — BUPROPION HYDROCHLORIDE 300 MG: 150 TABLET, FILM COATED, EXTENDED RELEASE ORAL at 09:41

## 2022-04-20 RX ADMIN — CARVEDILOL 3.12 MG: 3.12 TABLET, FILM COATED ORAL at 18:25

## 2022-04-20 RX ADMIN — BUPROPION HYDROCHLORIDE 300 MG: 150 TABLET, FILM COATED, EXTENDED RELEASE ORAL at 07:57

## 2022-04-20 RX ADMIN — Medication 10 ML: at 08:42

## 2022-04-20 RX ADMIN — ATORVASTATIN CALCIUM 40 MG: 40 TABLET, FILM COATED ORAL at 20:50

## 2022-04-20 RX ADMIN — ENOXAPARIN SODIUM 80 MG: 100 INJECTION SUBCUTANEOUS at 07:57

## 2022-04-20 NOTE — PROGRESS NOTES
[facility]    Name:  Lidia Lin   Age:  41 y.o.  Sex:  female  :  1980  MRN:  [id number]   Visit Number:  [id number]  Admission Date:  2022  Date Of Service:  22  Primary Care Physician:  Provider, No Known     LOS: 0 days :    Chief Complaint:      Chest pain    Subjective:    Patient seen and evaluated at bedside this morning.  She denies any further episodes of chest pain and is eager to speak with Cardiology this morning.  Discussed potential plan of care with troponins increasing to which she was agreeable.  Denies any chest pain or shortness of breath on exam.    Hospital Course:    The patient is a 41-year-old -American female with history of hypertension, hyperlipidemia, bariatric surgery, iron deficiency anemia, gestational diabetes, who had presented to the emergency room with complaints of chest pain.  Patient states symptoms initially started on  evening.  She was on her laptop, when she felt heaviness and pressure sensation in the center of her chest, which was radiating to her back and down her left arm.  She notes this did subside, subsequent had another episode later that evening, which resolved as well.  She noted she felt fine yesterday, however today at around 2:00 she started to have more complaints of pain that was similar to , slightly worsened.  This did improve as well.  However this evening, the discomfort returned, was significantly worse, she became sweaty/diaphoretic, and felt some shortness of breath.  She denied ever feeling the symptoms in the past.  She denies any known history of cardiovascular disease.  No history of alcohol, tobacco use, admits to occasional edible marijuana use.  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  Admits to a family history with mother having kidney disease and diabetes, father having congestive heart failure.     Of note, patient did recently travel to  [place] about a week ago via flight.  Denies any history of lower extremity swelling, pain, prior history of VTE.  Patient also noted that she was recently started back on lisinopril in addition to her HCTZ for her blood pressure being elevated, however has not picked up prescription for this yet from pharmacy.     In the ER, patient was hypertensive on arrival.  Heart rate in the 75 range.  Nonhypoxic on room air and afebrile.  CMP with a creatinine of 1.2 and a potassium of 3.4.  proBNP of 749.  A troponin of 0.326.  White count 4700 hemoglobin 13 and platelets of 308.  An EKG showed a sinus rhythm, rate of 75, nonspecific T wave changes in leads V4 and V5.  Patient was given 324 mg aspirin, morphine, and started on enoxaparin.  ER discussed the patient with cardiology, recommended admission.    Review of Systems:     All systems were reviewed and negative except as mentioned in subjective, assessment and plan.    Vital Signs:    Temp:  [97.7 °F (36.5 °C)-98.2 °F (36.8 °C)] 98.1 °F (36.7 °C)  Heart Rate:  [74-98] 75  Resp:  [16-20] 18  BP: (131-163)/() 145/108    Intake and output:    No intake/output data recorded.  No intake/output data recorded.    Physical Examination:    General Appearance:  Alert and cooperative, pleasant middle aged female, no acute distress on exam   Head:  Atraumatic and normocephalic.   Eyes: Conjunctivae and sclerae normal, no icterus. No pallor.   Throat: No oral lesions, no thrush, oral mucosa moist.   Neck: Supple, trachea midline   Lungs:   Breath sounds heard bilaterally equally.  No wheezing or crackles. Unlabored on room air   Heart:  Normal S1 and S2, RRR, no murmur, no gallop, no rub. No JVD.   Abdomen:   Normal bowel sounds, no masses, no organomegaly. Soft, nontender, nondistended, no rebound tenderness.   Extremities: Supple, no edema, no cyanosis, no clubbing.   Skin: No bleeding or rash.   Neurologic: Alert and oriented x 3. No facial asymmetry. Moves all four limbs.        Laboratory results:    Results from last 7 days   Lab Units 04/19/22  1855   SODIUM mmol/L 137   POTASSIUM mmol/L 3.4*   CHLORIDE mmol/L 99   CO2 mmol/L 26.6   BUN mg/dL 9   CREATININE mg/dL 1.20*   CALCIUM mg/dL 9.1   BILIRUBIN mg/dL 0.4   ALK PHOS U/L 95   ALT (SGPT) U/L 12   AST (SGOT) U/L 23   GLUCOSE mg/dL 114*     Results from last 7 days   Lab Units 04/19/22  1855   WBC 10*3/mm3 4.78   HEMOGLOBIN g/dL 13.0   HEMATOCRIT % 39.1   PLATELETS 10*3/mm3 308         Results from last 7 days   Lab Units 04/20/22  0101 04/19/22  1855   TROPONIN T ng/mL 0.450* 0.326*             I have reviewed the patient's laboratory results.    Radiology results:    XR Chest 1 View    Result Date: 4/20/2022  PROCEDURE: XR CHEST 1 VW-    HISTORY: cp  COMPARISON: None.  FINDINGS: The heart is normal in size. The mediastinum is unremarkable. The lungs are clear. There is no pneumothorax. There are no acute osseous abnormalities.      Impression: No acute cardiopulmonary process.        Images were reviewed, interpreted, and dictated by Dr. Carter Hsu M.D. Transcribed by Amber Antonio PA-C.    I have reviewed the patient's radiology reports.    Medication Review:     I have reviewed the patient's active and prn medications.     Problem List:      Elevated troponin    Essential hypertension    Elevated serum creatinine      Assessment:      1. Chest pain with elevated troponin, concern for NSTEMI, present admission, acute  2. Uncontrolled essential hypertension  3. Elevated creatinine, appears stable from baseline 4 years ago      Plan:    Concern for NSTEMI:  - Continue with Lovenox.  Added carvedilol for blood pressure.  As needed morphine/nitroglycerin.    -Follow-up troponins trended up  -2D echo pending  -Continue with Telemetry monitoring and monitoring for chest pain.    -N.p.o. after midnight with plans for cardiac catheterization in the morning as cath lab is not available until late this afternoon.    -D-dimer  negative  -Cholesterol panel resulted;  Hemoglobin A1c-5.2     Essential hypertension:  Added carvedilol, continue HCTZ, start lisinopril prior to discharge.  Will need to monitor renal function if receives contrast tomorrow with [facility]    DVT Prophylaxis: Lovenox  Code Status: Full Code  Diet: NPO after midnight  Discharge Plan: Home when stable    PAMELA Salcedo  04/20/22  11:15 EDT    Dictated utilizing Dragon dictation.

## 2022-04-20 NOTE — H&P (VIEW-ONLY)
"     [facility] Consult Note    Lidia Lin  1980  [id number]  22    Requesting Physician: No ref. provider found    Chief Complaint: Chest pain    History of Present Illness:   Mrs. Lidia Lin is a 41 y.o. female who is being seen by Cardiology for evaluation of chest pain.  The patient has a past medical history significant for hypertension, gestational diabetes, iron deficiency anemia, and prior bariatric surgery.  She does not have any significant past cardiac history.  She presented to the [facility] for evaluation of chest pain.  The patient reports her chest discomfort initially began on , at which time she felt a \"heaviness\" in her chest while she was working on her laptop.  The chest discomfort resolved after several minutes.  She denies any recurrent episodes of chest pain until yesterday afternoon when she developed recurrent substernal chest heaviness.  She does report associated shortness of breath.  The pain radiated to her back as well as down her left arm.  The chest discomfort subsided, however recurred several hours later prompting her to present to the emergency department for evaluation.    Upon evaluation emergency department, the patient was found to be hypertensive.  Initial EKG showed sinus rhythm with T wave inversion in the lateral leads.  Initial labs were significant for an acute kidney injury with a creatinine of 1.2.  A proBNP was mildly elevated at 749.  Initial troponin was elevated at 0.326, which is trended up overnight to 0.45 this morning.  A D-dimer was obtained, and was within normal limits.  The patient was given aspirin and subsequently started on Lovenox per ACS protocol.  Since admission, the patient has not had any recurrent episodes of chest pain or chest discomfort.  Cardiology has been consulted for further management.    Prior Cardiac Testin. Last Coronary Angio: None  2. Prior Stress Testing: " None  3. Last Echo: None  4. Prior Holter Monitor: None    Review of Systems:   Review of Systems   Constitutional: Negative for activity change, appetite change, chills, diaphoresis, fatigue, fever, unexpected weight gain and unexpected weight loss.   Eyes: Negative for blurred vision and double vision.   Respiratory: Positive for chest tightness and shortness of breath. Negative for cough and wheezing.    Cardiovascular: Positive for chest pain. Negative for palpitations and leg swelling.   Gastrointestinal: Negative for abdominal pain, anal bleeding, blood in stool and GERD.   Endocrine: Negative for cold intolerance and heat intolerance.   Genitourinary: Negative for hematuria.   Neurological: Negative for dizziness, syncope, weakness and light-headedness.   Hematological: Does not bruise/bleed easily.   Psychiatric/Behavioral: Negative for depressed mood and stress. The patient is not nervous/anxious.        Past Medical History:   Past Medical History:   Diagnosis Date   • Hypertension        Past Surgical History:   Past Surgical History:   Procedure Laterality Date   • BARIATRIC SURGERY  2008    [place]   • BARIATRIC SURGERY  2015    Lapband removal   •  SECTION  2011    [place], [place]   • ENDOMETRIAL ABLATION  2015    [facility]   • LEEP      [place], [place]       Family History:   Family History   Problem Relation Age of Onset   • Diabetes Mother    • Heart disease Father    • Cancer Maternal Grandmother         lung   • Pancreatic cancer Paternal Uncle    • Breast cancer Neg Hx    • Ovarian cancer Neg Hx        Social History:   Social History     Socioeconomic History   • Marital status:    Tobacco Use   • Smoking status: Never Smoker   • Smokeless tobacco: Never Used   Substance and Sexual Activity   • Alcohol use: No   • Drug use: Yes     Types: Marijuana     Comment: twice ever- quit in 3/2017   • Sexual activity: Defer       Medications:     Current Facility-Administered  Medications:   •  acetaminophen (TYLENOL) tablet 650 mg, 650 mg, Oral, Q4H PRN **OR** acetaminophen (TYLENOL) 160 MG/5ML solution 650 mg, 650 mg, Oral, Q4H PRN **OR** acetaminophen (TYLENOL) suppository 650 mg, 650 mg, Rectal, Q4H PRN, Andie Meraz DO  •  aluminum-magnesium hydroxide-simethicone (MAALOX MAX) 400-400-40 MG/5ML suspension 15 mL, 15 mL, Oral, Q6H PRN, Andie Meraz DO  •  atorvastatin (LIPITOR) tablet 40 mg, 40 mg, Oral, Nightly, Andie Meraz DO, 40 mg at 04/19/22 2103  •  buPROPion SR (WELLBUTRIN SR) 12 hr tablet 300 mg, 300 mg, Oral, Daily, Andie Meraz DO, 300 mg at 04/20/22 0757  •  carvedilol (COREG) tablet 3.125 mg, 3.125 mg, Oral, BID With Meals, Andie Meraz DO, 3.125 mg at 04/20/22 0757  •  enoxaparin (LOVENOX) syringe 80 mg, 1 mg/kg, Subcutaneous, Q12H, Andie Meraz DO, 80 mg at 04/20/22 0757  •  FLUoxetine (PROzac) capsule 40 mg, 40 mg, Oral, Daily, Andie Meraz DO, 40 mg at 04/20/22 0757  •  hydroCHLOROthiazide (HYDRODIURIL) tablet 25 mg, 25 mg, Oral, Daily, Andie Meraz DO, 25 mg at 04/20/22 0757  •  [START ON 4/21/2022] lisinopril (PRINIVIL,ZESTRIL) tablet 10 mg, 10 mg, Oral, Daily, Andie Meraz DO  •  melatonin tablet 5 mg, 5 mg, Oral, Nightly PRN, Andie Meraz DO  •  Morphine sulfate (PF) injection 2 mg, 2 mg, Intravenous, Q4H PRN **AND** naloxone (NARCAN) injection 0.4 mg, 0.4 mg, Intravenous, Q5 Min PRN, Andie Meraz DO  •  nitroglycerin (NITROSTAT) SL tablet 0.4 mg, 0.4 mg, Sublingual, Q5 Min PRN, Andie Meraz DO  •  ondansetron (ZOFRAN) tablet 4 mg, 4 mg, Oral, Q6H PRN **OR** ondansetron (ZOFRAN) injection 4 mg, 4 mg, Intravenous, Q6H PRN, Andie Meraz DO  •  pantoprazole (PROTONIX) EC tablet 40 mg, 40 mg, Oral, QAM, Andie Meraz DO, 40 mg at 04/20/22 0619  •  Pharmacy to Dose enoxaparin (LOVENOX), , Does not apply, Continuous PRN,  "Andie Meraz DO  •  [COMPLETED] Insert peripheral IV, , , Once **AND** sodium chloride 0.9 % flush 10 mL, 10 mL, Intravenous, PRN, Andie Merazus, DO  •  sodium chloride 0.9 % flush 10 mL, 10 mL, Intravenous, Q12H, Andie Meraz, DO, 10 mL at 04/19/22 2103  •  sodium chloride 0.9 % flush 10 mL, 10 mL, Intravenous, PRN, Andie Merazus, DO    Allergies:   Allergies   Allergen Reactions   • Latex    • Adhesive Tape Rash       Physical Exam:  Vital Signs:   Vitals:    04/19/22 2016 04/19/22 2331 04/20/22 0318 04/20/22 0736   BP: (!) 154/111 131/91 144/98 (!) 145/108   BP Location: Left arm Right arm Right arm Right arm   Patient Position: Lying Sitting Lying Lying   Pulse: 80 98 74 75   Resp: 18 20 18 18   Temp: 97.7 °F (36.5 °C) 97.9 °F (36.6 °C) 97.9 °F (36.6 °C) 98.1 °F (36.7 °C)   TempSrc: Oral Oral Oral Oral   SpO2: 100% 100%  100%   Weight: 80.6 kg (177 lb 11.1 oz)      Height: 167.6 cm (66\")          Physical Exam  Vitals and nursing note reviewed.   Constitutional:       General: She is not in acute distress.     Appearance: Normal appearance. She is well-developed. She is not diaphoretic.   HENT:      Head: Normocephalic and atraumatic.   Eyes:      General: No scleral icterus.     Pupils: Pupils are equal, round, and reactive to light.   Neck:      Trachea: No tracheal deviation.   Cardiovascular:      Rate and Rhythm: Normal rate and regular rhythm.      Heart sounds: Normal heart sounds. No murmur heard.    No friction rub. No gallop.      Comments: Normal JVD.  Pulmonary:      Effort: Pulmonary effort is normal. No respiratory distress.      Breath sounds: Normal breath sounds. No stridor. No wheezing or rales.   Chest:      Chest wall: No tenderness.   Abdominal:      General: Bowel sounds are normal. There is no distension.      Palpations: Abdomen is soft.      Tenderness: There is no abdominal tenderness. There is no guarding or rebound.   Musculoskeletal:         " General: No swelling. Normal range of motion.      Cervical back: Neck supple. No tenderness.   Lymphadenopathy:      Cervical: No cervical adenopathy.   Skin:     General: Skin is warm and dry.      Findings: No erythema.   Neurological:      General: No focal deficit present.      Mental Status: She is alert and oriented to person, place, and time.   Psychiatric:         Mood and Affect: Mood normal.         Behavior: Behavior normal.         Results Review:   Results from last 7 days   Lab Units 04/19/22  1855   SODIUM mmol/L 137   POTASSIUM mmol/L 3.4*   CHLORIDE mmol/L 99   CO2 mmol/L 26.6   BUN mg/dL 9   CREATININE mg/dL 1.20*   CALCIUM mg/dL 9.1   BILIRUBIN mg/dL 0.4   ALK PHOS U/L 95   ALT (SGPT) U/L 12   AST (SGOT) U/L 23   GLUCOSE mg/dL 114*     Results from last 7 days   Lab Units 04/20/22  0101 04/19/22  1855   TROPONIN T ng/mL 0.450* 0.326*     Results from last 7 days   Lab Units 04/19/22  1855   WBC 10*3/mm3 4.78   HEMOGLOBIN g/dL 13.0   HEMATOCRIT % 39.1   PLATELETS 10*3/mm3 308             Results from last 7 days   Lab Units 04/20/22  0630   CHOLESTEROL mg/dL 205*   TRIGLYCERIDES mg/dL 57   HDL CHOL mg/dL 62*   LDL CHOL mg/dL 133*       I personally viewed and interpreted the patient's EKG/Telemetry data     Assessment / Plan:     1.  NSTEMI  -- No significant past cardiac history  -- Current chest pain and mild troponin elevation consistent with type I NSTEMI  -- ECG with T wave inversion in the lateral leads, concerning for ischemia  -- Troponin mildly elevated but upward trending at 0.45 this morning  -- Will obtain echocardiogram to evaluate LV systolic function  -- Discussed the risks and benefits of proceeding with coronary angiography with online PCI as indicated, however given Cath Lab availability not until this afternoon the patient has requested not to remain n.p.o. today but proceed with coronary angiography tomorrow morning  -- Continue Lovenox per ACS protocol today, hold after  midnight  -- Plan for coronary angiogram tomorrow morning, will make n.p.o. after midnight  -- Continue aspirin and high intensity statin    2.  Hypertension  -- If persistent hypertension today, will uptitrate carvedilol tomorrow  -- Continue lisinopril and HCTZ        Thank you for allowing me to participate in the care of your patient. Please do not hesitate to contact me with additional questions or concerns.     KAMERON Thornton MD  Interventional Cardiology   04/20/22  09:04 EDT

## 2022-04-20 NOTE — CONSULTS
"     [facility] Cardiology Consult Note    Lidia Lin  1980  [id number]  22    Requesting Physician: No ref. provider found    Chief Complaint: Chest pain    History of Present Illness:   Mrs. Lidia Lin is a 41 y.o. female who is being seen by Cardiology for evaluation of chest pain.  The patient has a past medical history significant for hypertension, gestational diabetes, iron deficiency anemia, and prior bariatric surgery.  She does not have any significant past cardiac history.  She presented to the [facility] for evaluation of chest pain.  The patient reports her chest discomfort initially began on , at which time she felt a \"heaviness\" in her chest while she was working on her laptop.  The chest discomfort resolved after several minutes.  She denies any recurrent episodes of chest pain until yesterday afternoon when she developed recurrent substernal chest heaviness.  She does report associated shortness of breath.  The pain radiated to her back as well as down her left arm.  The chest discomfort subsided, however recurred several hours later prompting her to present to the emergency department for evaluation.    Upon evaluation emergency department, the patient was found to be hypertensive.  Initial EKG showed sinus rhythm with T wave inversion in the lateral leads.  Initial labs were significant for an acute kidney injury with a creatinine of 1.2.  A proBNP was mildly elevated at 749.  Initial troponin was elevated at 0.326, which is trended up overnight to 0.45 this morning.  A D-dimer was obtained, and was within normal limits.  The patient was given aspirin and subsequently started on Lovenox per ACS protocol.  Since admission, the patient has not had any recurrent episodes of chest pain or chest discomfort.  Cardiology has been consulted for further management.    Prior Cardiac Testin. Last Coronary Angio: None  2. Prior Stress Testing: " 08-Jun-2019 None  3. Last Echo: None  4. Prior Holter Monitor: None    Review of Systems:   Review of Systems   Constitutional: Negative for activity change, appetite change, chills, diaphoresis, fatigue, fever, unexpected weight gain and unexpected weight loss.   Eyes: Negative for blurred vision and double vision.   Respiratory: Positive for chest tightness and shortness of breath. Negative for cough and wheezing.    Cardiovascular: Positive for chest pain. Negative for palpitations and leg swelling.   Gastrointestinal: Negative for abdominal pain, anal bleeding, blood in stool and GERD.   Endocrine: Negative for cold intolerance and heat intolerance.   Genitourinary: Negative for hematuria.   Neurological: Negative for dizziness, syncope, weakness and light-headedness.   Hematological: Does not bruise/bleed easily.   Psychiatric/Behavioral: Negative for depressed mood and stress. The patient is not nervous/anxious.        Past Medical History:   Past Medical History:   Diagnosis Date   • Hypertension        Past Surgical History:   Past Surgical History:   Procedure Laterality Date   • BARIATRIC SURGERY  2008    [place]   • BARIATRIC SURGERY  2015    Lapband removal   •  SECTION  2011    [place], [place]   • ENDOMETRIAL ABLATION  2015    [facility]   • LEEP      [place], [place]       Family History:   Family History   Problem Relation Age of Onset   • Diabetes Mother    • Heart disease Father    • Cancer Maternal Grandmother         lung   • Pancreatic cancer Paternal Uncle    • Breast cancer Neg Hx    • Ovarian cancer Neg Hx        Social History:   Social History     Socioeconomic History   • Marital status:    Tobacco Use   • Smoking status: Never Smoker   • Smokeless tobacco: Never Used   Substance and Sexual Activity   • Alcohol use: No   • Drug use: Yes     Types: Marijuana     Comment: twice ever- quit in 3/2017   • Sexual activity: Defer       Medications:     Current Facility-Administered  Medications:   •  acetaminophen (TYLENOL) tablet 650 mg, 650 mg, Oral, Q4H PRN **OR** acetaminophen (TYLENOL) 160 MG/5ML solution 650 mg, 650 mg, Oral, Q4H PRN **OR** acetaminophen (TYLENOL) suppository 650 mg, 650 mg, Rectal, Q4H PRN, Andie Meraz DO  •  aluminum-magnesium hydroxide-simethicone (MAALOX MAX) 400-400-40 MG/5ML suspension 15 mL, 15 mL, Oral, Q6H PRN, Andie Meraz DO  •  atorvastatin (LIPITOR) tablet 40 mg, 40 mg, Oral, Nightly, Andie Meraz DO, 40 mg at 04/19/22 2103  •  buPROPion SR (WELLBUTRIN SR) 12 hr tablet 300 mg, 300 mg, Oral, Daily, Andie Meraz DO, 300 mg at 04/20/22 0757  •  carvedilol (COREG) tablet 3.125 mg, 3.125 mg, Oral, BID With Meals, Andie Meraz DO, 3.125 mg at 04/20/22 0757  •  enoxaparin (LOVENOX) syringe 80 mg, 1 mg/kg, Subcutaneous, Q12H, Andie Meraz DO, 80 mg at 04/20/22 0757  •  FLUoxetine (PROzac) capsule 40 mg, 40 mg, Oral, Daily, Andie Meraz DO, 40 mg at 04/20/22 0757  •  hydroCHLOROthiazide (HYDRODIURIL) tablet 25 mg, 25 mg, Oral, Daily, Andie Meraz DO, 25 mg at 04/20/22 0757  •  [START ON 4/21/2022] lisinopril (PRINIVIL,ZESTRIL) tablet 10 mg, 10 mg, Oral, Daily, Andie Meraz DO  •  melatonin tablet 5 mg, 5 mg, Oral, Nightly PRN, Andie Meraz DO  •  Morphine sulfate (PF) injection 2 mg, 2 mg, Intravenous, Q4H PRN **AND** naloxone (NARCAN) injection 0.4 mg, 0.4 mg, Intravenous, Q5 Min PRN, Andie Meraz DO  •  nitroglycerin (NITROSTAT) SL tablet 0.4 mg, 0.4 mg, Sublingual, Q5 Min PRN, Andie Meraz DO  •  ondansetron (ZOFRAN) tablet 4 mg, 4 mg, Oral, Q6H PRN **OR** ondansetron (ZOFRAN) injection 4 mg, 4 mg, Intravenous, Q6H PRN, Andie Meraz DO  •  pantoprazole (PROTONIX) EC tablet 40 mg, 40 mg, Oral, QAM, Andie Meraz DO, 40 mg at 04/20/22 0619  •  Pharmacy to Dose enoxaparin (LOVENOX), , Does not apply, Continuous PRN,  "Andie Meraz DO  •  [COMPLETED] Insert peripheral IV, , , Once **AND** sodium chloride 0.9 % flush 10 mL, 10 mL, Intravenous, PRN, Andie Merazus, DO  •  sodium chloride 0.9 % flush 10 mL, 10 mL, Intravenous, Q12H, Andie Meraz, DO, 10 mL at 04/19/22 2103  •  sodium chloride 0.9 % flush 10 mL, 10 mL, Intravenous, PRN, Andie Merazus, DO    Allergies:   Allergies   Allergen Reactions   • Latex    • Adhesive Tape Rash       Physical Exam:  Vital Signs:   Vitals:    04/19/22 2016 04/19/22 2331 04/20/22 0318 04/20/22 0736   BP: (!) 154/111 131/91 144/98 (!) 145/108   BP Location: Left arm Right arm Right arm Right arm   Patient Position: Lying Sitting Lying Lying   Pulse: 80 98 74 75   Resp: 18 20 18 18   Temp: 97.7 °F (36.5 °C) 97.9 °F (36.6 °C) 97.9 °F (36.6 °C) 98.1 °F (36.7 °C)   TempSrc: Oral Oral Oral Oral   SpO2: 100% 100%  100%   Weight: 80.6 kg (177 lb 11.1 oz)      Height: 167.6 cm (66\")          Physical Exam  Vitals and nursing note reviewed.   Constitutional:       General: She is not in acute distress.     Appearance: Normal appearance. She is well-developed. She is not diaphoretic.   HENT:      Head: Normocephalic and atraumatic.   Eyes:      General: No scleral icterus.     Pupils: Pupils are equal, round, and reactive to light.   Neck:      Trachea: No tracheal deviation.   Cardiovascular:      Rate and Rhythm: Normal rate and regular rhythm.      Heart sounds: Normal heart sounds. No murmur heard.    No friction rub. No gallop.      Comments: Normal JVD.  Pulmonary:      Effort: Pulmonary effort is normal. No respiratory distress.      Breath sounds: Normal breath sounds. No stridor. No wheezing or rales.   Chest:      Chest wall: No tenderness.   Abdominal:      General: Bowel sounds are normal. There is no distension.      Palpations: Abdomen is soft.      Tenderness: There is no abdominal tenderness. There is no guarding or rebound.   Musculoskeletal:         " General: No swelling. Normal range of motion.      Cervical back: Neck supple. No tenderness.   Lymphadenopathy:      Cervical: No cervical adenopathy.   Skin:     General: Skin is warm and dry.      Findings: No erythema.   Neurological:      General: No focal deficit present.      Mental Status: She is alert and oriented to person, place, and time.   Psychiatric:         Mood and Affect: Mood normal.         Behavior: Behavior normal.         Results Review:   Results from last 7 days   Lab Units 04/19/22  1855   SODIUM mmol/L 137   POTASSIUM mmol/L 3.4*   CHLORIDE mmol/L 99   CO2 mmol/L 26.6   BUN mg/dL 9   CREATININE mg/dL 1.20*   CALCIUM mg/dL 9.1   BILIRUBIN mg/dL 0.4   ALK PHOS U/L 95   ALT (SGPT) U/L 12   AST (SGOT) U/L 23   GLUCOSE mg/dL 114*     Results from last 7 days   Lab Units 04/20/22  0101 04/19/22  1855   TROPONIN T ng/mL 0.450* 0.326*     Results from last 7 days   Lab Units 04/19/22  1855   WBC 10*3/mm3 4.78   HEMOGLOBIN g/dL 13.0   HEMATOCRIT % 39.1   PLATELETS 10*3/mm3 308             Results from last 7 days   Lab Units 04/20/22  0630   CHOLESTEROL mg/dL 205*   TRIGLYCERIDES mg/dL 57   HDL CHOL mg/dL 62*   LDL CHOL mg/dL 133*       I personally viewed and interpreted the patient's EKG/Telemetry data     Assessment / Plan:     1.  NSTEMI  -- No significant past cardiac history  -- Current chest pain and mild troponin elevation consistent with type I NSTEMI  -- ECG with T wave inversion in the lateral leads, concerning for ischemia  -- Troponin mildly elevated but upward trending at 0.45 this morning  -- Will obtain echocardiogram to evaluate LV systolic function  -- Discussed the risks and benefits of proceeding with coronary angiography with online PCI as indicated, however given Cath Lab availability not until this afternoon the patient has requested not to remain n.p.o. today but proceed with coronary angiography tomorrow morning  -- Continue Lovenox per ACS protocol today, hold after  midnight  -- Plan for coronary angiogram tomorrow morning, will make n.p.o. after midnight  -- Continue aspirin and high intensity statin    2.  Hypertension  -- If persistent hypertension today, will uptitrate carvedilol tomorrow  -- Continue lisinopril and HCTZ        Thank you for allowing me to participate in the care of your patient. Please do not hesitate to contact me with additional questions or concerns.     KAMERON Thornton MD  Interventional Cardiology   04/20/22  09:04 EDT

## 2022-04-20 NOTE — CASE MANAGEMENT/SOCIAL WORK
Discharge Planning Assessment  [facility]     Patient Name: Lidia Lin  MRN: [id number]  Today's Date: 2022    Admit Date: 2022     Discharge Needs Assessment     Row Name 22 [id number]       Living Environment    People in Home spouse    Name(s) of People in Home Spouse- Rose    Current Living Arrangements home    Primary Care Provided by self    Provides Primary Care For child(hugo)    Family Caregiver if Needed spouse    Quality of Family Relationships supportive;helpful    Able to Return to Prior Arrangements yes       Resource/Environmental Concerns    Resource/Environmental Concerns none    Transportation Concerns none       Transition Planning    Patient/Family Anticipates Transition to home    Patient/Family Anticipated Services at Transition nutritionist    Transportation Anticipated family or friend will provide       Discharge Needs Assessment    Equipment Currently Used at Home none    Concerns to be Addressed no discharge needs identified    Anticipated Changes Related to Illness none    Equipment Needed After Discharge none    Provided Post Acute Provider List? N/A    Provided Post Acute Provider Quality & Resource List? N/A               Discharge Plan     Row Name 22 1860       Plan    Plan Comments SW met with pt at bedside. Pt was able to verify name, , and address. Pt states that she lives at home with her spouse, Rose, and two children. Pt states that her PCP is a nurse practitioner at [facility], but she could not remember the name. Pt states that she was independent before coming to the hospital, and able to complete tasks on her own. Pt states that she does not use DME at home. Pt states that she does not use O2. Pt states that she does not need DME at discharge. Pt states that she has not used HH in the past. Pt denies needing HH at discharge. Pt states that she has reliable transportation and able to get to medical appointments. Pt states that she is  adjusting to her health issues and has a therapist she can see if needed. Pt states she would like to have a referral to a nutritionist. SEAMUS contacted [facility] in nutrition who stated a PCP could fax ([phone]) a referral in for the pt. SEAMUS will give information to pt.              Continued Care and Services - Admitted Since 4/19/2022    Coordination has not been started for this encounter.       Expected Discharge Date and Time     Expected Discharge Date Expected Discharge Time    Apr 22, 2022          Demographic Summary     Row Name 04/20/22 1344       General Information    Admission Type observation    Arrived From home    Referral Source emergency department    Reason for Consult discharge planning    Preferred Language English               Functional Status     Row Name 04/20/22 1346       Functional Status    Usual Activity Tolerance excellent    Current Activity Tolerance good       Functional Status, IADL    Medications independent    Meal Preparation independent    Housekeeping independent    Laundry independent    Shopping independent       Mental Status    General Appearance WDL WDL       Mental Status Summary    Recent Changes in Mental Status/Cognitive Functioning no changes       Employment/    Employment Status employed full-time               Psychosocial     Row Name 04/20/22 1347       Behavior WDL    Behavior WDL WDL       Emotion Mood WDL    Emotion/Mood/Affect WDL WDL       Speech WDL    Speech WDL WDL       Perceptual State WDL    Perceptual State WDL WDL       Thought Process WDL    Thought Process WDL WDL       Intellectual Performance WDL    Intellectual Performance WDL WDL       Coping/Stress    Major Change/Loss/Stressor none    Patient Personal Strengths positive attitude;able to adapt;expressive of emotions    Sources of Support spouse    Techniques to [place] with Loss/Stress/Change counseling    Reaction to Health Status adjusting    Understanding of Condition and Treatment  adequate understanding of medical condition       Developmental Stage (Eriksson's)    Developmental Stage Stage 7 (35-65 years/Middle Adulthood) Generativity vs. Stagnation       C-SSRS (Recent)    Q1 Wished to be Dead (Past Month) no    Q2 Suicidal Thoughts (Past Month) no    Q6 Suicide Behavior (Lifetime) no       Violence Risk    Feels Like Hurting Others no    Previous Attempt to Harm Others no              PIERRE Condon

## 2022-04-20 NOTE — PLAN OF CARE
Goal Outcome Evaluation:  Plan of Care Reviewed With: patient        Progress: improving  Outcome Evaluation: New admit. VSS. No c/o chest pain. Will continue to monitor   Partial Purse String (Intermediate) Text: Given the location of the defect and the characteristics of the surrounding skin an intermediate purse string closure was deemed most appropriate.  Undermining was performed circumfirentially around the surgical defect.  A purse string suture was then placed and tightened. Wound tension only allowed a partial closure of the circular defect.

## 2022-04-21 VITALS
TEMPERATURE: 98.4 F | SYSTOLIC BLOOD PRESSURE: 104 MMHG | RESPIRATION RATE: 18 BRPM | OXYGEN SATURATION: 97 % | DIASTOLIC BLOOD PRESSURE: 56 MMHG | HEART RATE: 84 BPM | BODY MASS INDEX: 28.56 KG/M2 | HEIGHT: 66 IN | WEIGHT: 177.69 LBS

## 2022-04-21 PROBLEM — I50.21 ACUTE SYSTOLIC HEART FAILURE: Status: ACTIVE | Noted: 2022-04-21

## 2022-04-21 PROBLEM — I21.4 NSTEMI (NON-ST ELEVATED MYOCARDIAL INFARCTION) (HCC): Status: RESOLVED | Noted: 2022-04-19 | Resolved: 2022-04-21

## 2022-04-21 PROBLEM — I40.1 ACUTE IDIOPATHIC MYOCARDITIS: Status: ACTIVE | Noted: 2022-04-21

## 2022-04-21 LAB
ANION GAP SERPL CALCULATED.3IONS-SCNC: 8.7 MMOL/L (ref 5–15)
BUN SERPL-MCNC: 11 MG/DL (ref 6–20)
BUN/CREAT SERPL: 9.5 (ref 7–25)
CALCIUM SPEC-SCNC: 8.8 MG/DL (ref 8.6–10.5)
CHLORIDE SERPL-SCNC: 102 MMOL/L (ref 98–107)
CO2 SERPL-SCNC: 26.3 MMOL/L (ref 22–29)
CREAT SERPL-MCNC: 1.16 MG/DL (ref 0.57–1)
EGFRCR SERPLBLD CKD-EPI 2021: 60.1 ML/MIN/1.73
GLUCOSE SERPL-MCNC: 89 MG/DL (ref 65–99)
POTASSIUM SERPL-SCNC: 3.8 MMOL/L (ref 3.5–5.2)
SODIUM SERPL-SCNC: 137 MMOL/L (ref 136–145)

## 2022-04-21 PROCEDURE — 93454 CORONARY ARTERY ANGIO S&I: CPT | Performed by: INTERNAL MEDICINE

## 2022-04-21 PROCEDURE — C1769 GUIDE WIRE: HCPCS | Performed by: INTERNAL MEDICINE

## 2022-04-21 PROCEDURE — 99217 PR OBSERVATION CARE DISCHARGE MANAGEMENT: CPT | Performed by: NURSE PRACTITIONER

## 2022-04-21 PROCEDURE — 99152 MOD SED SAME PHYS/QHP 5/>YRS: CPT | Performed by: INTERNAL MEDICINE

## 2022-04-21 PROCEDURE — 0 IOPAMIDOL PER 1 ML: Performed by: INTERNAL MEDICINE

## 2022-04-21 PROCEDURE — G0378 HOSPITAL OBSERVATION PER HR: HCPCS

## 2022-04-21 PROCEDURE — 25010000002 MIDAZOLAM PER 1MG: Performed by: INTERNAL MEDICINE

## 2022-04-21 PROCEDURE — 25010000002 HEPARIN (PORCINE) PER 1000 UNITS: Performed by: INTERNAL MEDICINE

## 2022-04-21 PROCEDURE — C1894 INTRO/SHEATH, NON-LASER: HCPCS | Performed by: INTERNAL MEDICINE

## 2022-04-21 PROCEDURE — 80048 BASIC METABOLIC PNL TOTAL CA: CPT | Performed by: NURSE PRACTITIONER

## 2022-04-21 PROCEDURE — 0 LIDOCAINE 1 % SOLUTION: Performed by: INTERNAL MEDICINE

## 2022-04-21 PROCEDURE — 25010000002 FENTANYL CITRATE (PF) 50 MCG/ML SOLUTION: Performed by: INTERNAL MEDICINE

## 2022-04-21 RX ORDER — VALSARTAN 40 MG/1
40 TABLET ORAL DAILY
Qty: 30 TABLET | Refills: 0 | Status: SHIPPED | OUTPATIENT
Start: 2022-04-21 | End: 2022-06-16 | Stop reason: DRUGHIGH

## 2022-04-21 RX ORDER — BUPROPION HYDROCHLORIDE 150 MG/1
300 TABLET ORAL DAILY
Status: DISCONTINUED | OUTPATIENT
Start: 2022-04-21 | End: 2022-04-21 | Stop reason: HOSPADM

## 2022-04-21 RX ORDER — HYDROCODONE BITARTRATE AND ACETAMINOPHEN 5; 325 MG/1; MG/1
1 TABLET ORAL EVERY 4 HOURS PRN
Status: DISCONTINUED | OUTPATIENT
Start: 2022-04-21 | End: 2022-04-21 | Stop reason: HOSPADM

## 2022-04-21 RX ORDER — ONDANSETRON 2 MG/ML
4 INJECTION INTRAMUSCULAR; INTRAVENOUS EVERY 6 HOURS PRN
Status: DISCONTINUED | OUTPATIENT
Start: 2022-04-21 | End: 2022-04-21 | Stop reason: HOSPADM

## 2022-04-21 RX ORDER — MIDAZOLAM HYDROCHLORIDE 2 MG/2ML
INJECTION, SOLUTION INTRAMUSCULAR; INTRAVENOUS AS NEEDED
Status: DISCONTINUED | OUTPATIENT
Start: 2022-04-21 | End: 2022-04-21 | Stop reason: HOSPADM

## 2022-04-21 RX ORDER — ACETAMINOPHEN 325 MG/1
650 TABLET ORAL EVERY 4 HOURS PRN
Status: DISCONTINUED | OUTPATIENT
Start: 2022-04-21 | End: 2022-04-21 | Stop reason: HOSPADM

## 2022-04-21 RX ORDER — SODIUM CHLORIDE 9 MG/ML
100 INJECTION, SOLUTION INTRAVENOUS CONTINUOUS
Status: ACTIVE | OUTPATIENT
Start: 2022-04-21 | End: 2022-04-21

## 2022-04-21 RX ORDER — ASPIRIN 81 MG/1
81 TABLET ORAL DAILY
Qty: 30 TABLET | Refills: 0 | Status: SHIPPED | OUTPATIENT
Start: 2022-04-22 | End: 2022-05-22

## 2022-04-21 RX ORDER — TEMAZEPAM 15 MG/1
15 CAPSULE ORAL NIGHTLY PRN
Status: DISCONTINUED | OUTPATIENT
Start: 2022-04-21 | End: 2022-04-21 | Stop reason: HOSPADM

## 2022-04-21 RX ORDER — ATORVASTATIN CALCIUM 40 MG/1
40 TABLET, FILM COATED ORAL NIGHTLY
Qty: 30 TABLET | Refills: 0 | Status: SHIPPED | OUTPATIENT
Start: 2022-04-21 | End: 2022-05-21

## 2022-04-21 RX ORDER — ONDANSETRON 4 MG/1
4 TABLET, FILM COATED ORAL EVERY 6 HOURS PRN
Status: DISCONTINUED | OUTPATIENT
Start: 2022-04-21 | End: 2022-04-21 | Stop reason: HOSPADM

## 2022-04-21 RX ORDER — FENTANYL CITRATE 50 UG/ML
INJECTION, SOLUTION INTRAMUSCULAR; INTRAVENOUS AS NEEDED
Status: DISCONTINUED | OUTPATIENT
Start: 2022-04-21 | End: 2022-04-21 | Stop reason: HOSPADM

## 2022-04-21 RX ORDER — DIPHENHYDRAMINE HYDROCHLORIDE 50 MG/ML
25 INJECTION INTRAMUSCULAR; INTRAVENOUS EVERY 6 HOURS PRN
Status: DISCONTINUED | OUTPATIENT
Start: 2022-04-21 | End: 2022-04-21 | Stop reason: HOSPADM

## 2022-04-21 RX ORDER — LIDOCAINE HYDROCHLORIDE 10 MG/ML
INJECTION, SOLUTION INFILTRATION; PERINEURAL AS NEEDED
Status: DISCONTINUED | OUTPATIENT
Start: 2022-04-21 | End: 2022-04-21 | Stop reason: HOSPADM

## 2022-04-21 RX ORDER — CARVEDILOL 3.12 MG/1
3.12 TABLET ORAL 2 TIMES DAILY WITH MEALS
Qty: 60 TABLET | Refills: 0 | Status: SHIPPED | OUTPATIENT
Start: 2022-04-21 | End: 2022-06-16 | Stop reason: DRUGHIGH

## 2022-04-21 RX ORDER — VALSARTAN 80 MG/1
40 TABLET ORAL DAILY
Status: DISCONTINUED | OUTPATIENT
Start: 2022-04-21 | End: 2022-04-21 | Stop reason: HOSPADM

## 2022-04-21 RX ORDER — ALPRAZOLAM 0.25 MG/1
0.25 TABLET ORAL 3 TIMES DAILY PRN
Status: DISCONTINUED | OUTPATIENT
Start: 2022-04-21 | End: 2022-04-21 | Stop reason: HOSPADM

## 2022-04-21 RX ADMIN — LISINOPRIL 10 MG: 10 TABLET ORAL at 08:31

## 2022-04-21 RX ADMIN — HYDROXYZINE PAMOATE 25 MG: 25 CAPSULE ORAL at 08:34

## 2022-04-21 RX ADMIN — ASPIRIN 81 MG: 81 TABLET, COATED ORAL at 08:31

## 2022-04-21 RX ADMIN — HYDROCHLOROTHIAZIDE 25 MG: 25 TABLET ORAL at 08:31

## 2022-04-21 RX ADMIN — PANTOPRAZOLE SODIUM 40 MG: 40 TABLET, DELAYED RELEASE ORAL at 06:41

## 2022-04-21 RX ADMIN — VALSARTAN 40 MG: 80 TABLET, FILM COATED ORAL at 15:32

## 2022-04-21 RX ADMIN — CARVEDILOL 3.12 MG: 3.12 TABLET, FILM COATED ORAL at 08:31

## 2022-04-21 RX ADMIN — Medication 10 ML: at 08:31

## 2022-04-21 RX ADMIN — FLUOXETINE 40 MG: 20 CAPSULE ORAL at 08:31

## 2022-04-21 RX ADMIN — BUPROPION HYDROCHLORIDE 300 MG: 150 TABLET, FILM COATED, EXTENDED RELEASE ORAL at 08:31

## 2022-04-21 NOTE — NURSING NOTE
Referral received for Phase II Cardiac Rehab. Staff reviewed chart and patient has qualifying diagnosis for Phase II Cardiac Rehab.  Attempted to speak with Pt, but another staff member was currently working with Pt.  Will try back later today.

## 2022-04-21 NOTE — DISCHARGE INSTRUCTIONS
You are being discharged home today.  You have a new diagnosis of heart failure.  You will need to take the following medications to help your heart pump efficiently--Aspirin 81mg, Coreg 3.125 twice daily, Losartan 40mg daily, and Lipitor 40mg daily.  Stop taking your Lisinopril and your Hydrochlorothiazide! You may resume your other home medications.  Follow up with  in 2-4 weeks for a recheck.  Follow up with your PCP next week for a recheck.  You will need to have your kidney function rechecked by your PCP.  Return to the hospital with any increased shortness of breath, chest pain, or palpitations, or any increased swelling.

## 2022-04-21 NOTE — INTERVAL H&P NOTE
H&P reviewed. The patient was examined and there are no changes to the H&P.      Addendum  Physical examination  Ear  Nose and throat: Normal gag reflex  Neck: Normal central venous pressure  Peripheral pulses: Left and right radial pulses are present with normal Anthony testing  Lungs: Clear to auscultation  Heart: Regular rate and rhythm.  Normal S1.  Normal S2.  No murmurs rubs or gallops.

## 2022-04-21 NOTE — DISCHARGE SUMMARY
[facility]   DISCHARGE SUMMARY      Name:  Lidia Lin   Age:  41 y.o.  Sex:  female  :  1980  MRN:  [id number]   Visit Number:  [id number]    Admission Date:  2022  Date of Discharge:  2022  Primary Care Physician:  Provider, No Known    Important issues to note:    1.  Admitted to the hospital for chest pain and elevated troponins.  Cardiology consulted.    2.  Echo noted new onset heart failure suspect acute myocarditis.  Heart cath performed by Dr. Louis and did not show evidence of MI, acute myocarditis (likely viral), and minor obstructive coronary artery disease.    3.  Patient was started on guideline directed therapy with Coreg, ASA, Lipitor, and Diovan.  Continue at discharge.  Recommended outpatient cardiac rehab consult.  Follow up with Dr. Thornton in 2-4 weeks for recheck and PCP next week.  Return to the hospital with any increased shortness of breath, chest pain, or palpitations. Recommend rechecking BMP with PCP next week.    Discharge Diagnoses:     Acute idiopathic myocarditis  New onset left ventricular heart failure  Hypertension  Elevated Creatinine, at baseline    Problem List:     Active Hospital Problems    Diagnosis  POA   • **Acute idiopathic myocarditis [I40.1]  Yes   • Acute systolic heart failure (CMS/HCC) [I50.21]  Yes   • Essential hypertension [I10]  Yes   • Elevated troponin [R77.8]  Yes   • Elevated serum creatinine [R79.89]  Yes      Resolved Hospital Problems    Diagnosis Date Resolved POA   • NSTEMI (non-ST elevated myocardial infarction) (HCC) [I21.4] 2022 Unknown     Presenting Problem:    Chief Complaint   Patient presents with   • Chest Pain      Consults:     Consulting Physician(s)  Chat With All Active Members    Provider Relationship Specialty    Kwame Thornton MD  Consulting Physician Cardiology        Procedures Performed:    Procedure(s):  Coronary angiography    History of presenting illness/Hospital  Course:    The patient is a 41-year-old -American female with history of hypertension, hyperlipidemia, bariatric surgery, iron deficiency anemia, gestational diabetes, who had presented to the emergency room 4/19/2022 with complaints of chest pain.  Patient stated symptoms initially started on Sunday evening.  She was on her laptop, when she felt heaviness and pressure sensation in the center of her chest, which was radiating to her back and down her left arm with another episode later that night.  The discomfort returned, was significantly worse, she became sweaty/diaphoretic, and felt some shortness of breath.  She denied ever feeling the symptoms in the past.  She denies any known history of cardiovascular disease.  No history of alcohol, tobacco use, admits to occasional edible marijuana use.  She works as a therapist.  Does not routinely exercise.  Admits to a family history with mother having kidney disease and diabetes, father having congestive heart failure.  Denies any history of lower extremity swelling, pain, prior history of VTE.  Patient also noted that she was recently started back on lisinopril in addition to her HCTZ for her blood pressure being elevated, however has not picked up prescription for this yet from pharmacy.     In the ER, patient was hypertensive on arrival. CMP with a creatinine of 1.2 and a potassium of 3.4.  proBNP of 749.  A troponin of 0.326.  White count 4700 hemoglobin 13 and platelets of 308.  An EKG showed a sinus rhythm, rate of 75, nonspecific T wave changes in leads V4 and V5.  Patient was given 324 mg aspirin, morphine, and started on enoxaparin. D-dimer was checked with recent travel and was negative.  Cardiology was consulted on admission.    Echocardiogram was performed and noted left ventricular ejection fraction of 30-35% with global hypokinesis.  Acute post viral myocarditis was suspected.  Dr. Louis took for heart catheterization today to definitively  exclude coronary artery disease as the cause of her new onset heart failure.  Findings from heart cath were acute myocarditis (likely viral), minor obstructive coronary artery disease, and no evidence of MI.  Recommended guideline directed therapy, aggressive secondary risk factor modification, and outpatient referral for cardiac rehab.  Patient to follow up with Dr. Thornton in 2-4 weeks for a recheck.  Discharge home with medications per cardiology--ASA 81mg, Lipitor 40mg, Coreg 3.125mg, and Diovan 40mg.  Return to the hospital with any increased shortness of breath, chest pain, or palpitations.  Follow up with PCP next week for a recheck and recommend rechecking BMP at that time.  Stopping Lisinopril and HCTZ at discharge per cardiology recommendation and medication changes.    Vital Signs:    Temp:  [98.3 °F (36.8 °C)-98.9 °F (37.2 °C)] 98.4 °F (36.9 °C)  Heart Rate:  [73-93] 73  Resp:  [16-18] 18  BP: (120-166)/() 120/86    Physical Exam:    General Appearance:  Alert and cooperative, pleasant middle aged female, no acute distress on exam   Head:  Atraumatic and normocephalic.   Eyes: Conjunctivae and sclerae normal, no icterus. No pallor.   Ears:  Ears with no abnormalities noted.   Throat: No oral lesions, no thrush, oral mucosa moist.   Neck: Supple, trachea midline   Back:   No kyphoscoliosis present. No tenderness to palpation.   Lungs:   Breath sounds heard bilaterally equally.  No crackles or wheezing. Unlabored on room air   Heart:  Normal S1 and S2, no murmur, no gallop, no rub. No JVD.   Abdomen:   Normal bowel sounds, no masses, no organomegaly. Soft, nontender, nondistended, no rebound tenderness.   Extremities: Supple, no edema, no cyanosis, no clubbing.   Pulses: Pulses palpable bilaterally.   Skin: No bleeding or rash.   Neurologic: Alert and oriented x 3. No facial asymmetry. Moves all four limbs.      Pertinent Lab Results:     Results from last 7 days   Lab Units 04/21/22  2193  04/19/22  1855   SODIUM mmol/L 137 137   POTASSIUM mmol/L 3.8 3.4*   CHLORIDE mmol/L 102 99   CO2 mmol/L 26.3 26.6   BUN mg/dL 11 9   CREATININE mg/dL 1.16* 1.20*   CALCIUM mg/dL 8.8 9.1   BILIRUBIN mg/dL  --  0.4   ALK PHOS U/L  --  95   ALT (SGPT) U/L  --  12   AST (SGOT) U/L  --  23   GLUCOSE mg/dL 89 114*     Results from last 7 days   Lab Units 04/19/22  1855   WBC 10*3/mm3 4.78   HEMOGLOBIN g/dL 13.0   HEMATOCRIT % 39.1   PLATELETS 10*3/mm3 308         Results from last 7 days   Lab Units 04/20/22  0101 04/19/22  1855   TROPONIN T ng/mL 0.450* 0.326*     Results from last 7 days   Lab Units 04/19/22  1855   PROBNP pg/mL 749.6*                       Pertinent Radiology Results:    Imaging Results (All)     Procedure Component Value Units Date/Time    XR Chest 1 View [[id number]] Collected: 04/20/22 1032     Updated: 04/20/22 1122    Narrative:      PROCEDURE: XR CHEST 1 VW-        HISTORY: cp     COMPARISON: None.     FINDINGS: The heart is normal in size. The mediastinum is unremarkable.  The lungs are clear. There is no pneumothorax. There are no acute  osseous abnormalities.       Impression:      No acute cardiopulmonary process.                       Images were reviewed, interpreted, and dictated by Dr. Carter Hsu M.D.  Transcribed by Amber Antonio PA-C.     This report was signed and finalized on 4/20/2022 11:20 AM by Carter Hsu M.D..          Echo:    Results for orders placed during the hospital encounter of 04/19/22    Adult Transthoracic Echo Complete W/ Cont if Necessary Per Protocol    Interpretation Summary  1.  Normal left ventricular size with moderately reduced LV systolic function, LVEF 30-35%.  2.  Moderate global LV hypokinesis.  3.  Grade 1 diastolic dysfunction.  4.  Normal right ventricular size and systolic function by TAPSE.  5.  Normal left and right atrial size.  6.  No significant valvular abnormalities.  7.  Abnormal LV global longitudinal strain  pattern.    Condition on Discharge:      Stable.    Code status during the hospital stay:    Code Status and Medical Interventions:   Ordered at: 04/19/22 1958     Code Status (Patient has no pulse and is not breathing):    CPR (Attempt to Resuscitate)     Medical Interventions (Patient has pulse or is breathing):    Full Support     Discharge Disposition:    Home    Discharge Medications:       Discharge Medications      New Medications      Instructions Start Date   aspirin 81 MG EC tablet   81 mg, Oral, Daily   Start Date: April 22, 2022     atorvastatin 40 MG tablet  Commonly known as: LIPITOR   40 mg, Oral, Nightly      carvedilol 3.125 MG tablet  Commonly known as: COREG   3.125 mg, Oral, 2 Times Daily With Meals      valsartan 40 MG tablet  Commonly known as: DIOVAN   40 mg, Oral, Daily         Continue These Medications      Instructions Start Date   buPROPion  MG 24 hr tablet  Commonly known as: WELLBUTRIN XL   300 mg, Oral, Daily      FLUoxetine 20 MG capsule  Commonly known as: PROzac   40 mg, Oral, Daily      hydrOXYzine 10 MG tablet  Commonly known as: ATARAX   10 mg, Oral, Every 4 Hours PRN      omeprazole 20 MG capsule  Commonly known as: priLOSEC   20 mg, Oral, Daily         Stop These Medications    hydroCHLOROthiazide 25 MG tablet  Commonly known as: HYDRODIURIL     lisinopril 10 MG tablet  Commonly known as: PRINIVIL,ZESTRIL     sertraline 50 MG tablet  Commonly known as: ZOLOFT          Discharge Diet:     Diet Instructions     Diet: Regular, Cardiac      Discharge Diet:  Regular  Cardiac           Activity at Discharge:     Activity Instructions     Activity as Tolerated      Gradually Increase Activity Until at Pre-Hospitalization Level          Follow-up Appointments:     Follow-up Information     Ibeth Ruvalcaba MD Follow up.    Specialty: Family Medicine  Why: Please schedule appointment to be seen next week prior to discharge for hospital follow up.  Contact information:  [phone] [facility]  [address]  [facility] [address]  [phone]             Kwame Tohrnton MD Follow up.    Specialties: Cardiology, Interventional Cardiology, Internal Medicine  Why: Please schedule follow up appointment for 2-4 weeks prior to discharge  Contact information:  [address]  [address]  [facility] [address] [phone]                       No future appointments.             Maryjane Levin, PAMELA  04/21/22  12:35 EDT    Time: I spent 28 minutes on this discharge activity which included: face-to-face encounter with the patient, reviewing the data in the system, coordination of the care with the nursing staff as well as consultants, documentation, and entering orders.     Dictated utilizing Dragon dictation.

## 2022-04-21 NOTE — CASE MANAGEMENT/SOCIAL WORK
Case Management Discharge Note           Provided Post Acute Provider List?: N/A  Provided Post Acute Provider Quality & Resource List?: N/A    Selected Continued Care - Admitted Since 4/19/2022     Destination    No services have been selected for the patient.              Durable Medical Equipment    No services have been selected for the patient.              Dialysis/Infusion    No services have been selected for the patient.              Home Medical Care    No services have been selected for the patient.              Therapy    No services have been selected for the patient.              Community Resources    No services have been selected for the patient.              Community & DME    No services have been selected for the patient.                  Transportation Services  Private: Car    Final Discharge Disposition Code: 01 - home or self-care

## 2022-04-21 NOTE — CONSULTS
Adult Nutrition  Assessment/PES    Patient Name:  Lidia Lin  YOB: 1980  MRN: [id number]  Admit Date:  4/19/2022    Assessment Date:  4/21/2022    Comments:      RD provided diet edu reviewing Heart Failure Nutrition Therapy from [facility]. RD discussed foods that are high in sodium and ways to limit sodium intake. RD also reviewed nutrition therapy for iron-deficiency anemia d/t pt's PMH. RD reviewed foods high in iron and ways to increase absorption such as pairing iron-rich foods with a vitamin C source. Pt voiced no nutrition-related questions or concerns at this time. RD left edu materials and contact information with pt and encouraged her to reach out with any questions or concerns.     Recommend:    1. Continue current diet as medically appropriate and tolerated.   2. Establish and encourage PO intake as appropriate.   3. RD ordered Boost Plus BID.  4. Consider MVI with minerals daily.   5. Consider iron supplement d/t PMH of iron-deficiency anemia.   6. Continue to monitor and replace electrolytes PRN.     RD to follow pt and available PRN.      Reason for Assessment     Row Name 04/21/22 1400          Reason for Assessment    Reason For Assessment nurse/nurse practitioner consult;diagnosis/disease state     Diagnosis cardiac disease;hematological/related complications;other (see comments)  iron-deficiency anemia, HTN, acute systolic heart failure, hx bariatric surgery     Identified At Risk by Screening Criteria need for education                Nutrition/Diet History     Row Name 04/21/22 1402          Nutrition/Diet History    Food Intolerance(s) latex allergy                  Labs/Tests/Procedures/Meds     Row Name 04/21/22 1402          Labs/Procedures/Meds    Lab Results Reviewed reviewed, pertinent     Lab Results Comments high: Cr, total cholesterol            Medications    Pertinent Medications Reviewed reviewed, pertinent     Pertinent Medications Comments lipitor, protonix, NaCl                 Physical Findings     Row Name 04/21/22 1403          Physical Findings    Overall Physical Appearance overweight, SOB                  Nutrition Prescription Ordered     Row Name 04/21/22 1511          Nutrition Prescription PO    Current PO Diet Regular     Common Modifiers Cardiac                       Problem/Interventions:   Problem 1     Row Name 04/21/22 1511          Nutrition Diagnoses Problem 1    Problem 1 Predicted Suboptimal Intake     Etiology (related to) Medical Diagnosis     Cardiac Other (comment)  Acute idiopathic myocarditis, acute systolic heart failure     Signs/Symptoms (evidenced by) Other (comment)  diet recently advanced                      Intervention Goal     Row Name 04/21/22 1515          Intervention Goal    General Maintain nutrition;Disease management/therapy;Provide information regarding MNT for treatment/condition;Reduce/improve symptoms;Improved nutrition related lab(s);Meet nutritional needs for age/condition     PO Meet estimated needs;Establish PO;Tolerate PO     Weight Maintain weight                Nutrition Intervention     Row Name 04/21/22 1515          Nutrition Intervention    RD/Tech Action Follow Tx progress;Care plan reviewd;Encourage intake;Recommend/ordered     Recommended/Ordered Supplement                Nutrition Prescription     Row Name 04/21/22 1515          Nutrition Prescription PO    PO Prescription Begin/change supplement;Other (comment)  Continue current diet as medically appropriate and tolerated     Supplement Boost Plus     Supplement Frequency 2 times a day     Common Modifiers Cardiac     New PO Prescription Ordered? No, recommended            Other Orders    Obtain Weight Daily     Supplement Vitamin mineral supplement;Iron supplement     Supplement Ordered? No, recommended     Other Continue to monitor and replace electrolytes PRN                Education/Evaluation     Row Name 04/21/22 1517          Education    Education Provided  education regarding     Provided education regarding Avoidance/improvement of symptoms;Medical diagnosis;Avoidance of associated complications;Nutrition related factor;Diet rationale            Monitor/Evaluation    Monitor Per protocol;I&O;PO intake;Supplement intake;Pertinent labs;Weight;Symptoms                 Electronically signed by:  Yoli Szymanski RD  04/21/22 15:17 EDT

## 2022-04-22 ENCOUNTER — READMISSION MANAGEMENT (OUTPATIENT)
Dept: CALL CENTER | Facility: HOSPITAL | Age: 42
End: 2022-04-22

## 2022-04-22 NOTE — OUTREACH NOTE
Prep Survey    Flowsheet Row Responses   [facility] discharged from? [facility]   Is LACE score < 7 ? Yes   Emergency Room discharge w/ pulse ox? No   Eligibility Readm Mgmt   Discharge diagnosis Acute idiopathic myocarditis   Does the patient have one of the following disease processes/diagnoses(primary or secondary)? CHF   Does the patient have Home health ordered? No   Is there a DME ordered? No   Prep survey completed? Yes          CEE DELCID - Registered Nurse

## 2022-04-24 ENCOUNTER — APPOINTMENT (OUTPATIENT)
Dept: GENERAL RADIOLOGY | Facility: HOSPITAL | Age: 42
End: 2022-04-24

## 2022-04-24 ENCOUNTER — HOSPITAL ENCOUNTER (EMERGENCY)
Facility: HOSPITAL | Age: 42
Discharge: HOME | End: 2022-04-24
Attending: EMERGENCY MEDICINE | Admitting: EMERGENCY MEDICINE

## 2022-04-24 VITALS
HEIGHT: 66 IN | WEIGHT: 180 LBS | BODY MASS INDEX: 28.93 KG/M2 | OXYGEN SATURATION: 98 % | HEART RATE: 84 BPM | DIASTOLIC BLOOD PRESSURE: 96 MMHG | TEMPERATURE: 98.4 F | RESPIRATION RATE: 18 BRPM | SYSTOLIC BLOOD PRESSURE: 140 MMHG

## 2022-04-24 DIAGNOSIS — I40.1 IDIOPATHIC MYOCARDITIS, UNSPECIFIED CHRONICITY: ICD-10-CM

## 2022-04-24 DIAGNOSIS — R07.9 CHEST PAIN, UNSPECIFIED TYPE: Primary | ICD-10-CM

## 2022-04-24 DIAGNOSIS — I10 ELEVATED BLOOD PRESSURE READING WITH DIAGNOSIS OF HYPERTENSION: ICD-10-CM

## 2022-04-24 LAB
ALBUMIN SERPL-MCNC: 3.8 G/DL (ref 3.5–5.2)
ALBUMIN/GLOB SERPL: 1.3 G/DL
ALP SERPL-CCNC: 79 U/L (ref 39–117)
ALT SERPL W P-5'-P-CCNC: 22 U/L (ref 1–33)
ANION GAP SERPL CALCULATED.3IONS-SCNC: 11.8 MMOL/L (ref 5–15)
AST SERPL-CCNC: 38 U/L (ref 1–32)
BASOPHILS # BLD AUTO: 0.02 10*3/MM3 (ref 0–0.2)
BASOPHILS NFR BLD AUTO: 0.4 % (ref 0–1.5)
BILIRUB SERPL-MCNC: 0.4 MG/DL (ref 0–1.2)
BUN SERPL-MCNC: 16 MG/DL (ref 6–20)
BUN/CREAT SERPL: 13.6 (ref 7–25)
CALCIUM SPEC-SCNC: 8.9 MG/DL (ref 8.6–10.5)
CHLORIDE SERPL-SCNC: 99 MMOL/L (ref 98–107)
CO2 SERPL-SCNC: 24.2 MMOL/L (ref 22–29)
CREAT SERPL-MCNC: 1.18 MG/DL (ref 0.57–1)
DEPRECATED RDW RBC AUTO: 41.1 FL (ref 37–54)
EGFRCR SERPLBLD CKD-EPI 2021: 58.9 ML/MIN/1.73
EOSINOPHIL # BLD AUTO: 0.08 10*3/MM3 (ref 0–0.4)
EOSINOPHIL NFR BLD AUTO: 1.5 % (ref 0.3–6.2)
ERYTHROCYTE [DISTWIDTH] IN BLOOD BY AUTOMATED COUNT: 13.2 % (ref 12.3–15.4)
GLOBULIN UR ELPH-MCNC: 3 GM/DL
GLUCOSE SERPL-MCNC: 104 MG/DL (ref 65–99)
HCT VFR BLD AUTO: 36.4 % (ref 34–46.6)
HGB BLD-MCNC: 12.3 G/DL (ref 12–15.9)
HOLD SPECIMEN: NORMAL
HOLD SPECIMEN: NORMAL
IMM GRANULOCYTES # BLD AUTO: 0.01 10*3/MM3 (ref 0–0.05)
IMM GRANULOCYTES NFR BLD AUTO: 0.2 % (ref 0–0.5)
LYMPHOCYTES # BLD AUTO: 2.5 10*3/MM3 (ref 0.7–3.1)
LYMPHOCYTES NFR BLD AUTO: 46.9 % (ref 19.6–45.3)
MCH RBC QN AUTO: 28.7 PG (ref 26.6–33)
MCHC RBC AUTO-ENTMCNC: 33.8 G/DL (ref 31.5–35.7)
MCV RBC AUTO: 85 FL (ref 79–97)
MONOCYTES # BLD AUTO: 0.51 10*3/MM3 (ref 0.1–0.9)
MONOCYTES NFR BLD AUTO: 9.6 % (ref 5–12)
NEUTROPHILS NFR BLD AUTO: 2.21 10*3/MM3 (ref 1.7–7)
NEUTROPHILS NFR BLD AUTO: 41.4 % (ref 42.7–76)
NRBC BLD AUTO-RTO: 0 /100 WBC (ref 0–0.2)
PLATELET # BLD AUTO: 307 10*3/MM3 (ref 140–450)
PMV BLD AUTO: 10.8 FL (ref 6–12)
POTASSIUM SERPL-SCNC: 4.4 MMOL/L (ref 3.5–5.2)
PROT SERPL-MCNC: 6.8 G/DL (ref 6–8.5)
RBC # BLD AUTO: 4.28 10*6/MM3 (ref 3.77–5.28)
SODIUM SERPL-SCNC: 135 MMOL/L (ref 136–145)
TROPONIN T SERPL-MCNC: 0.05 NG/ML (ref 0–0.03)
TROPONIN T SERPL-MCNC: 0.06 NG/ML (ref 0–0.03)
WBC NRBC COR # BLD: 5.33 10*3/MM3 (ref 3.4–10.8)
WHOLE BLOOD HOLD SPECIMEN: NORMAL
WHOLE BLOOD HOLD SPECIMEN: NORMAL

## 2022-04-24 PROCEDURE — 36415 COLL VENOUS BLD VENIPUNCTURE: CPT

## 2022-04-24 PROCEDURE — 96374 THER/PROPH/DIAG INJ IV PUSH: CPT

## 2022-04-24 PROCEDURE — 85025 COMPLETE CBC W/AUTO DIFF WBC: CPT | Performed by: EMERGENCY MEDICINE

## 2022-04-24 PROCEDURE — 84484 ASSAY OF TROPONIN QUANT: CPT | Performed by: EMERGENCY MEDICINE

## 2022-04-24 PROCEDURE — 25010000002 MORPHINE PER 10 MG: Performed by: EMERGENCY MEDICINE

## 2022-04-24 PROCEDURE — 96375 TX/PRO/DX INJ NEW DRUG ADDON: CPT

## 2022-04-24 PROCEDURE — 99284 EMERGENCY DEPT VISIT MOD MDM: CPT

## 2022-04-24 PROCEDURE — 93005 ELECTROCARDIOGRAM TRACING: CPT | Performed by: EMERGENCY MEDICINE

## 2022-04-24 PROCEDURE — 80053 COMPREHEN METABOLIC PANEL: CPT | Performed by: EMERGENCY MEDICINE

## 2022-04-24 PROCEDURE — 25010000002 KETOROLAC TROMETHAMINE PER 15 MG: Performed by: EMERGENCY MEDICINE

## 2022-04-24 PROCEDURE — 71045 X-RAY EXAM CHEST 1 VIEW: CPT

## 2022-04-24 RX ORDER — ISOSORBIDE MONONITRATE 30 MG/1
30 TABLET, EXTENDED RELEASE ORAL DAILY
Qty: 30 TABLET | Refills: 0 | Status: SHIPPED | OUTPATIENT
Start: 2022-04-24 | End: 2022-09-20

## 2022-04-24 RX ORDER — SODIUM CHLORIDE 0.9 % (FLUSH) 0.9 %
10 SYRINGE (ML) INJECTION AS NEEDED
Status: DISCONTINUED | OUTPATIENT
Start: 2022-04-24 | End: 2022-04-24 | Stop reason: HOSPADM

## 2022-04-24 RX ORDER — KETOROLAC TROMETHAMINE 30 MG/ML
30 INJECTION, SOLUTION INTRAMUSCULAR; INTRAVENOUS ONCE
Status: COMPLETED | OUTPATIENT
Start: 2022-04-24 | End: 2022-04-24

## 2022-04-24 RX ORDER — MORPHINE SULFATE 4 MG/ML
4 INJECTION, SOLUTION INTRAMUSCULAR; INTRAVENOUS ONCE
Status: COMPLETED | OUTPATIENT
Start: 2022-04-24 | End: 2022-04-24

## 2022-04-24 RX ORDER — ISOSORBIDE MONONITRATE 30 MG/1
30 TABLET, EXTENDED RELEASE ORAL
Status: DISCONTINUED | OUTPATIENT
Start: 2022-04-24 | End: 2022-04-24 | Stop reason: HOSPADM

## 2022-04-24 RX ADMIN — KETOROLAC TROMETHAMINE 30 MG: 30 INJECTION, SOLUTION INTRAMUSCULAR at 06:57

## 2022-04-24 RX ADMIN — MORPHINE SULFATE 4 MG: 4 INJECTION, SOLUTION INTRAMUSCULAR; INTRAVENOUS at 05:38

## 2022-04-24 RX ADMIN — ISOSORBIDE MONONITRATE 30 MG: 30 TABLET, EXTENDED RELEASE ORAL at 08:39

## 2022-04-24 NOTE — ED PROVIDER NOTES
"Subjective   41-year-old female presents to the ED with a chief complaint of chest pain.  Patient is complaining of retrosternal chest pain that started around 2 AM.  It woke her up from sleep.  Pain radiates down her left arm.  Patient states \"I had a heart attack last week\".  Review of records actually show that the patient had a cath performed last week after she was admitted for an NSTEMI.  The patient had a cardiac cath that did not show any significant occlusions.  She had an echo that showed global hypokinesis and it was felt that she likely had acute idiopathic myocarditis likely viral in nature.  She was discharged on anti-inflammatories.  Cath was actually performed 3 days ago at our facility.  No nausea vomiting diarrhea abdominal pain.  No shortness of breath.  No lightheadedness visit.  No other complaints at this time.          Review of Systems   Cardiovascular: Positive for chest pain.   All other systems reviewed and are negative.      Past Medical History:   Diagnosis Date   • Acute idiopathic myocarditis 2022   • Hypertension        Allergies   Allergen Reactions   • Latex    • Adhesive Tape Rash       Past Surgical History:   Procedure Laterality Date   • BARIATRIC SURGERY  2008    [facility]   • BARIATRIC SURGERY  2015    Lapband removal   • CARDIAC CATHETERIZATION N/A 2022    Procedure: Coronary angiography;  Surgeon: Rajiv Louis MD;  Location: [facility] CATH INVASIVE LOCATION;  Service: Cardiology;  Laterality: N/A;   •  SECTION  2011    [place], [place]   • ENDOMETRIAL ABLATION  2015    [facility]   • LEEP      [place], [place]       Family History   Problem Relation Age of Onset   • Diabetes Mother    • Heart disease Father    • Cancer Maternal Grandmother         lung   • Pancreatic cancer Paternal Uncle    • Breast cancer Neg Hx    • Ovarian cancer Neg Hx        Social History     Socioeconomic History   • Marital status:    Tobacco Use   • Smoking status: Never " Smoker   • Smokeless tobacco: Never Used   Substance and Sexual Activity   • Alcohol use: No   • Drug use: Yes     Types: Marijuana     Comment: twice ever- quit in 3/2017   • Sexual activity: Defer           Objective   Physical Exam  Vitals and nursing note reviewed.   Constitutional:       General: She is not in acute distress.     Appearance: She is well-developed. She is not diaphoretic.   HENT:      Head: Normocephalic and atraumatic.      Nose: Nose normal.   Eyes:      Conjunctiva/sclera: Conjunctivae normal.   Cardiovascular:      Rate and Rhythm: Normal rate and regular rhythm.   Pulmonary:      Effort: Pulmonary effort is normal. No respiratory distress.      Breath sounds: Normal breath sounds.   Abdominal:      General: There is no distension.      Palpations: Abdomen is soft.      Tenderness: There is no abdominal tenderness. There is no guarding.   Musculoskeletal:         General: No deformity.   Neurological:      Mental Status: She is alert and oriented to person, place, and time.      Cranial Nerves: No cranial nerve deficit.         Procedures     none      ED Course  ED Course as of 04/24/22 0826   Sun Apr 24, 2022   0748 Chest x-ray interpreted by me shows no evidence of any cardiomegaly, effusion, infiltrate, or bony abnormality. [PF]   0818 Troponin T(!!): 0.050 [PF]   0818 Glucose(!): 104 [PF]   0818 BUN: 16 [PF]   0818 Creatinine(!): 1.18 [PF]   0818 Sodium(!): 135 [PF]   0818 Potassium: 4.4 [PF]   0818 Chloride: 99 [PF]   0818 CO2: 24.2 [PF]   0818 Calcium: 8.9 [PF]   0818 Total Protein: 6.8 [PF]   0818 Albumin: 3.80 [PF]   0818 ALT (SGPT): 22 [PF]   0818 AST (SGOT)(!): 38 [PF]   0818 Alkaline Phosphatase: 79 [PF]   0818 Total Bilirubin: 0.4 [PF]   0818 WBC: 5.33 [PF]   0818 Hemoglobin: 12.3 [PF]   0818 Hematocrit: 36.4 [PF]      ED Course User Index  [PF] Raz Goodson, DO                                                  MDM  PULSE OXIMETRY INTERPRETATION  Patient had a pulse ox of 100%  on room air. This is a normal pulse oximetry reading.    EKG interpreted by me.  Sinus rhythm.  Rate of 74.  No significant ST segment changes.  Normal EKG    08:26 EDT  I received care from Dr. Castillo in regards to follow-up of troponin.  Troponin is trending downward and patient to had recent heart cath which was without critical findings for non-STEMI.  Presumed myocarditis at time of dischagre from hospital. .  Patient having continued discomfort.  Was hypertensive on arrival improved to 145/98 with Toradol and morphine.  Discussed with Dr. Judd, advised can do addition of Imdur.  Keep outpatient follow-up.  Return precautions discussed.  Final diagnoses:   Chest pain, unspecified type   Idiopathic myocarditis, unspecified chronicity   Elevated blood pressure reading with diagnosis of hypertension       ED Disposition  ED Disposition     ED Disposition   Discharge    Condition   Stable    Comment   --             Rajiv Louis MD  [address] [place], [place] [id number] [phone]      for continued care / follow up.         Medication List      New Prescriptions    isosorbide mononitrate 30 MG 24 hr tablet  Commonly known as: IMDUR  Take 1 tablet by mouth Daily.           Where to Get Your Medications      Information about where to get these medications is not yet available    Ask your nurse or doctor about these medications  · isosorbide mononitrate 30 MG 24 hr tablet          Raz Goodson,   04/24/22 0880

## 2022-04-26 ENCOUNTER — READMISSION MANAGEMENT (OUTPATIENT)
Dept: CALL CENTER | Facility: HOSPITAL | Age: 42
End: 2022-04-26

## 2022-04-26 NOTE — OUTREACH NOTE
CHF Week 1 Survey    Flowsheet Row Responses   [facility] patient discharged from? [facility]   Does the patient have one of the following disease processes/diagnoses(primary or secondary)? CHF   CHF Week 1 attempt successful? No   Unsuccessful attempts Attempt 1          YIFAN BELL - Registered Nurse

## 2022-04-27 ENCOUNTER — READMISSION MANAGEMENT (OUTPATIENT)
Dept: CALL CENTER | Facility: HOSPITAL | Age: 42
End: 2022-04-27

## 2022-04-27 NOTE — OUTREACH NOTE
CHF Week 1 Survey    Flowsheet Row Responses   [facility] facility patient discharged from? [facility]   Does the patient have one of the following disease processes/diagnoses(primary or secondary)? CHF   CHF Week 1 attempt successful? No   Unsuccessful attempts Attempt 2          RADHA DON - Registered Nurse

## 2022-06-16 ENCOUNTER — OFFICE VISIT (OUTPATIENT)
Dept: CARDIOLOGY | Facility: CLINIC | Age: 42
End: 2022-06-16

## 2022-06-16 VITALS
DIASTOLIC BLOOD PRESSURE: 90 MMHG | OXYGEN SATURATION: 99 % | BODY MASS INDEX: 29.3 KG/M2 | SYSTOLIC BLOOD PRESSURE: 142 MMHG | HEIGHT: 66 IN | HEART RATE: 77 BPM | RESPIRATION RATE: 18 BRPM | WEIGHT: 182.3 LBS

## 2022-06-16 DIAGNOSIS — I10 ESSENTIAL HYPERTENSION: Chronic | ICD-10-CM

## 2022-06-16 DIAGNOSIS — I50.21 ACUTE SYSTOLIC HEART FAILURE: ICD-10-CM

## 2022-06-16 DIAGNOSIS — I40.1 ACUTE IDIOPATHIC MYOCARDITIS: Primary | ICD-10-CM

## 2022-06-16 PROCEDURE — 99213 OFFICE O/P EST LOW 20 MIN: CPT | Performed by: INTERNAL MEDICINE

## 2022-06-16 RX ORDER — ASPIRIN 81 MG/1
81 TABLET ORAL DAILY
Qty: 90 TABLET | Refills: 4 | Status: SHIPPED | OUTPATIENT
Start: 2022-06-16

## 2022-06-16 RX ORDER — ATORVASTATIN CALCIUM 40 MG/1
40 TABLET, FILM COATED ORAL DAILY
COMMUNITY
End: 2022-09-20

## 2022-06-16 RX ORDER — VALSARTAN 40 MG/1
40 TABLET ORAL DAILY
COMMUNITY
End: 2022-06-16 | Stop reason: DRUGHIGH

## 2022-06-16 RX ORDER — VALSARTAN 40 MG/1
40 TABLET ORAL 2 TIMES DAILY
Qty: 180 TABLET | Refills: 4 | Status: SHIPPED | OUTPATIENT
Start: 2022-06-16

## 2022-06-16 RX ORDER — CARVEDILOL 6.25 MG/1
6.25 TABLET ORAL 2 TIMES DAILY
Qty: 180 TABLET | Refills: 4 | Status: SHIPPED | OUTPATIENT
Start: 2022-06-16

## 2022-06-16 RX ORDER — CARVEDILOL 3.12 MG/1
3.12 TABLET ORAL 2 TIMES DAILY WITH MEALS
COMMUNITY
End: 2022-06-16 | Stop reason: DRUGHIGH

## 2022-06-16 NOTE — PROGRESS NOTES
Subjective:     Encounter Date:06/16/2022      Patient ID: Lidia Lin is a 41 y.o. female.    Chief Complaint: Myocarditis  HPI  This is a 46-year-old female patient who presents to cardiology clinic in follow-up after recent hospitalization for chest discomfort and shortness of breath.  The patient initially presented to the emergency room with chest discomfort and shortness of breath.  Twelve-lead electrocardiogram showed no ischemic ST-T wave changes or injury current.  Cardiac troponins were mildly elevated.  The patient underwent coronary angiography disclosing minor nonobstructive coronary atherosclerosis.  An echocardiogram was performed demonstrating left ventricular ejection fraction of 30-35%.  Initially the patient was thought to be experiencing a non-ST elevation myocardial infarction.  However, in the context of angiographic findings, it was felt that the patient was experiencing acute (most likely viral) myocarditis.  Since hospital discharge the patient has been seen in the emergency room for recurrent chest discomfort.  She was started on Imdur therapy at that time and her symptoms have improved.  She continues to experience dyspnea with moderate exertional activities.  The following portions of the patient's history were reviewed and updated as appropriate: allergies, current medications, past family history, past medical history, past social history, past surgical history and problem  Review of Systems   Constitutional: Negative for chills, diaphoresis, fever, malaise/fatigue, weight gain and weight loss.   HENT: Negative for ear discharge, hearing loss, hoarse voice and nosebleeds.    Eyes: Negative for discharge, double vision, pain and photophobia.   Cardiovascular: Positive for dyspnea on exertion. Negative for chest pain, claudication, cyanosis, irregular heartbeat, leg swelling, near-syncope, orthopnea, palpitations, paroxysmal nocturnal dyspnea and syncope.   Respiratory: Negative  for cough, hemoptysis, shortness of breath, sputum production and wheezing.    Endocrine: Negative for cold intolerance, heat intolerance, polydipsia, polyphagia and polyuria.   Hematologic/Lymphatic: Negative for adenopathy and bleeding problem. Does not bruise/bleed easily.   Skin: Negative for color change, flushing, itching and rash.   Musculoskeletal: Negative for muscle cramps, muscle weakness, myalgias and stiffness.   Gastrointestinal: Negative for abdominal pain, diarrhea, hematemesis, hematochezia, nausea and vomiting.   Genitourinary: Negative for dysuria, frequency and nocturia.   Neurological: Negative for focal weakness, loss of balance, numbness, paresthesias and seizures.   Psychiatric/Behavioral: Negative for altered mental status, hallucinations and suicidal ideas.   Allergic/Immunologic: Negative for HIV exposure, hives and persistent infections.           Current Outpatient Medications:   •  atorvastatin (LIPITOR) 40 MG tablet, Take 40 mg by mouth Daily., Disp: , Rfl:   •  buPROPion XL (WELLBUTRIN XL) 150 MG 24 hr tablet, Take 300 mg by mouth Daily., Disp: , Rfl:   •  FLUoxetine (PROzac) 20 MG capsule, Take 40 mg by mouth Daily., Disp: , Rfl:   •  hydrOXYzine (ATARAX) 10 MG tablet, Take 10 mg by mouth Every 4 (Four) Hours As Needed for Itching., Disp: , Rfl:   •  isosorbide mononitrate (IMDUR) 30 MG 24 hr tablet, Take 1 tablet by mouth Daily., Disp: 30 tablet, Rfl: 0  •  omeprazole (priLOSEC) 20 MG capsule, Take 20 mg by mouth Daily., Disp: , Rfl:   •  aspirin (aspirin) 81 MG EC tablet, Take 1 tablet by mouth Daily., Disp: 90 tablet, Rfl: 4  •  carvedilol (COREG) 6.25 MG tablet, Take 1 tablet by mouth 2 (Two) Times a Day., Disp: 180 tablet, Rfl: 4  •  valsartan (DIOVAN) 40 MG tablet, Take 1 tablet by mouth 2 (Two) Times a Day., Disp: 180 tablet, Rfl: 4    Objective:   Vitals and nursing note reviewed.   Constitutional:       Appearance: Healthy appearance. Not in distress.   Neck:      Vascular:  "No JVR. JVD normal.   Pulmonary:      Effort: Pulmonary effort is normal.      Breath sounds: Normal breath sounds. No wheezing. No rhonchi. No rales.   Chest:      Chest wall: Not tender to palpatation.   Cardiovascular:      PMI at left midclavicular line. Normal rate. Regular rhythm. Normal S1. Normal S2.      Murmurs: There is no murmur.      No gallop. No click. No rub.   Pulses:     Intact distal pulses.   Edema:     Peripheral edema absent.   Abdominal:      General: Bowel sounds are normal.      Palpations: Abdomen is soft.      Tenderness: There is no abdominal tenderness.   Musculoskeletal: Normal range of motion.         General: No tenderness. Skin:     General: Skin is warm and dry.   Neurological:      General: No focal deficit present.      Mental Status: Alert and oriented to person, place and time.       Blood pressure 142/90, pulse 77, resp. rate 18, height 167.6 cm (66\"), weight 82.7 kg (182 lb 4.8 oz), SpO2 99 %.   Lab Review:     Assessment:       1. Acute idiopathic myocarditis  Initially the patient was felt to be experiencing a non-ST elevation myocardial infarction.  However, invasive coronary angiography disclosed no focal obstructive coronary artery disease.  In addition, echocardiogram showed no regional wall motion abnormalities and the diagnosis of non-ST elevation myocardial infarction was subsequently refuted (or at least made less likely).  She has been diagnosed with acute idiopathic (presumably viral) myocarditis with LV dysfunction.  Symptoms are gradually improving. MIOCA remains a potential diagnosis.  Coronary angiography showed no evidence of plaque rupture or recent coronary thrombosis, coronary artery embolization or coronary artery dissection.  - Adult Transthoracic Echo Complete W/ Cont if Necessary Per Protocol    2. Acute systolic heart failure (CMS/HCC)  Heart failure with reduced ejection fraction.  Stage C.  New York Heart Association functional class II symptoms.  " Euvolemic.    3. Essential hypertension  Less than optimal blood pressure control.    Procedures    Plan:     I have recommended adding aspirin 81 mg orally once per day.    She is instructed to continue taking intermediate intensity statin based cholesterol-lowering therapy for a goal LDL cholesterol of less than 70 mg/dL.  Further management deferred to primary care provider.    I have recommended increasing Coreg to 6.25 mg orally twice daily.    I have recommended increasing Diovan to 40 mg orally twice daily.    I have recommended repeat echocardiogram.    Depending on the findings on echocardiogram and/or clinical symptoms, the patient may require outpatient cardiac MRI at the [facility] for further evaluation.

## 2022-08-09 ENCOUNTER — TELEPHONE (OUTPATIENT)
Dept: CARDIOLOGY | Facility: CLINIC | Age: 42
End: 2022-08-09

## 2022-09-20 ENCOUNTER — OFFICE VISIT (OUTPATIENT)
Dept: CARDIOLOGY | Facility: CLINIC | Age: 42
End: 2022-09-20

## 2022-09-20 VITALS
WEIGHT: 182 LBS | SYSTOLIC BLOOD PRESSURE: 118 MMHG | HEART RATE: 90 BPM | OXYGEN SATURATION: 99 % | DIASTOLIC BLOOD PRESSURE: 82 MMHG | BODY MASS INDEX: 29.25 KG/M2 | HEIGHT: 66 IN

## 2022-09-20 DIAGNOSIS — I40.1 ACUTE IDIOPATHIC MYOCARDITIS: Primary | ICD-10-CM

## 2022-09-20 DIAGNOSIS — I10 ESSENTIAL HYPERTENSION: Chronic | ICD-10-CM

## 2022-09-20 LAB
BH CV ECHO LEFT VENTRICLE GLOBAL LONGITUDINAL STRAIN: -12.3 %
BH CV ECHO MEAS - AO MAX PG: 8.8 MMHG
BH CV ECHO MEAS - AO MEAN PG: 4 MMHG
BH CV ECHO MEAS - AO ROOT DIAM: 1.6 CM
BH CV ECHO MEAS - AO V2 MAX: 148 CM/SEC
BH CV ECHO MEAS - AO V2 VTI: 25 CM
BH CV ECHO MEAS - AVA(I,D): 2.19 CM2
BH CV ECHO MEAS - EDV(CUBED): 119.1 ML
BH CV ECHO MEAS - EDV(MOD-SP4): 150 ML
BH CV ECHO MEAS - EF(MOD-SP4): 52.3 %
BH CV ECHO MEAS - ESV(CUBED): 48.6 ML
BH CV ECHO MEAS - ESV(MOD-SP4): 71.6 ML
BH CV ECHO MEAS - FS: 25.8 %
BH CV ECHO MEAS - IVS/LVPW: 0.65 CM
BH CV ECHO MEAS - IVSD: 0.75 CM
BH CV ECHO MEAS - LA DIMENSION: 3.7 CM
BH CV ECHO MEAS - LAT PEAK E' VEL: 13.9 CM/SEC
BH CV ECHO MEAS - LV DIASTOLIC VOL/BSA (35-75): 78.1 CM2
BH CV ECHO MEAS - LV MASS(C)D: 165.1 GRAMS
BH CV ECHO MEAS - LV MAX PG: 3 MMHG
BH CV ECHO MEAS - LV MEAN PG: 2 MMHG
BH CV ECHO MEAS - LV SYSTOLIC VOL/BSA (12-30): 37.3 CM2
BH CV ECHO MEAS - LV V1 MAX: 87 CM/SEC
BH CV ECHO MEAS - LV V1 VTI: 17.4 CM
BH CV ECHO MEAS - LVIDD: 4.9 CM
BH CV ECHO MEAS - LVIDS: 3.7 CM
BH CV ECHO MEAS - LVOT AREA: 3.1 CM2
BH CV ECHO MEAS - LVOT DIAM: 2 CM
BH CV ECHO MEAS - LVPWD: 1.15 CM
BH CV ECHO MEAS - MED PEAK E' VEL: 10.8 CM/SEC
BH CV ECHO MEAS - MV A MAX VEL: 44.5 CM/SEC
BH CV ECHO MEAS - MV DEC SLOPE: 523 CM/SEC2
BH CV ECHO MEAS - MV DEC TIME: 0.16 MSEC
BH CV ECHO MEAS - MV E MAX VEL: 79.7 CM/SEC
BH CV ECHO MEAS - MV E/A: 1.79
BH CV ECHO MEAS - MV MAX PG: 2.8 MMHG
BH CV ECHO MEAS - MV MEAN PG: 2 MMHG
BH CV ECHO MEAS - MV V2 VTI: 14.9 CM
BH CV ECHO MEAS - MVA(VTI): 3.7 CM2
BH CV ECHO MEAS - PA V2 MAX: 84.9 CM/SEC
BH CV ECHO MEAS - RAP SYSTOLE: 3 MMHG
BH CV ECHO MEAS - RVSP: 31 MMHG
BH CV ECHO MEAS - SI(MOD-SP4): 40.8 ML/M2
BH CV ECHO MEAS - SV(LVOT): 54.7 ML
BH CV ECHO MEAS - SV(MOD-SP4): 78.4 ML
BH CV ECHO MEAS - TR MAX PG: 28.4 MMHG
BH CV ECHO MEAS - TR MAX VEL: 266.3 CM/SEC
BH CV ECHO MEASUREMENTS AVERAGE E/E' RATIO: 6.45
LEFT ATRIUM VOLUME INDEX: 35.7 ML/M2
LV EF 2D ECHO EST: 52 %
MAXIMAL PREDICTED HEART RATE: 176 BPM
STRESS TARGET HR: 150 BPM

## 2022-09-20 PROCEDURE — 99213 OFFICE O/P EST LOW 20 MIN: CPT | Performed by: INTERNAL MEDICINE

## 2022-09-20 NOTE — PROGRESS NOTES
Subjective:     Encounter Date:09/20/2022      Patient ID: Lidia Lin is a 42 y.o. female.    Chief Complaint: Myocarditis  HPI  This is a 42-year-old female patient who was previously admitted with chest pain, nonspecific T wave changes on twelve-lead electrocardiogram and elevated cardiac troponins.  The patient underwent invasive coronary angiography disclosing angiographically normal coronary arteries.  Myocardial infarction was subsequently excluded as a potential diagnosis (MINOCA remained in the differential diagnosis).  The patient symptoms began shortly after a COVID-19 booster vaccine was administered.  The patient was given the presumptive diagnosis of acute myocarditis.  Her initial echocardiogram demonstrated an ejection fraction of 30-35%.  1 month later the patient had a repeat echocardiogram which showed improvement in her ejection fraction to 40-45%.  The patient had a repeat echocardiogram today which shows further improvement in her global left ventricular ejection fraction to 50-55%.  Global longitudinal strain was on the lower limits of normal at -12.  Left ventricular cardiac chamber dimensions have continued to improve.  She is asymptomatic from a cardiovascular perspective.  She indicates that she walks daily with no exertional symptoms or limitations.  The following portions of the patient's history were reviewed and updated as appropriate: allergies, current medications, past family history, past medical history, past social history, past surgical history and problem  Review of Systems   Constitutional: Negative for chills, diaphoresis, fever, malaise/fatigue, weight gain and weight loss.   HENT: Negative for ear discharge, hearing loss, hoarse voice and nosebleeds.    Eyes: Negative for discharge, double vision, pain and photophobia.   Cardiovascular: Negative for chest pain, claudication, cyanosis, dyspnea on exertion, irregular heartbeat, leg swelling, near-syncope, orthopnea,  palpitations, paroxysmal nocturnal dyspnea and syncope.   Respiratory: Negative for cough, hemoptysis, shortness of breath, sputum production and wheezing.    Endocrine: Negative for cold intolerance, heat intolerance, polydipsia, polyphagia and polyuria.   Hematologic/Lymphatic: Negative for adenopathy and bleeding problem. Does not bruise/bleed easily.   Skin: Negative for color change, flushing, itching and rash.   Musculoskeletal: Negative for muscle cramps, muscle weakness, myalgias and stiffness.   Gastrointestinal: Negative for abdominal pain, diarrhea, hematemesis, hematochezia, nausea and vomiting.   Genitourinary: Negative for dysuria, frequency and nocturia.   Neurological: Negative for focal weakness, loss of balance, numbness, paresthesias and seizures.   Psychiatric/Behavioral: Negative for altered mental status, hallucinations and suicidal ideas.   Allergic/Immunologic: Negative for HIV exposure, hives and persistent infections.           Current Outpatient Medications:   •  aspirin (aspirin) 81 MG EC tablet, Take 1 tablet by mouth Daily., Disp: 90 tablet, Rfl: 4  •  buPROPion XL (WELLBUTRIN XL) 150 MG 24 hr tablet, Take 300 mg by mouth Daily., Disp: , Rfl:   •  carvedilol (COREG) 6.25 MG tablet, Take 1 tablet by mouth 2 (Two) Times a Day., Disp: 180 tablet, Rfl: 4  •  FLUoxetine (PROzac) 20 MG capsule, Take 40 mg by mouth Daily., Disp: , Rfl:   •  hydrOXYzine (ATARAX) 10 MG tablet, Take 10 mg by mouth Every 4 (Four) Hours As Needed for Itching., Disp: , Rfl:   •  omeprazole (priLOSEC) 20 MG capsule, Take 20 mg by mouth Daily., Disp: , Rfl:   •  valsartan (DIOVAN) 40 MG tablet, Take 1 tablet by mouth 2 (Two) Times a Day., Disp: 180 tablet, Rfl: 4    Objective:   Vitals and nursing note reviewed.   Constitutional:       Appearance: Healthy appearance. Not in distress.   Neck:      Vascular: No JVR. JVD normal.   Pulmonary:      Effort: Pulmonary effort is normal.      Breath sounds: Normal breath  "sounds. No wheezing. No rhonchi. No rales.   Chest:      Chest wall: Not tender to palpatation.   Cardiovascular:      PMI at left midclavicular line. Normal rate. Regular rhythm. Normal S1. Normal S2.      Murmurs: There is no murmur.      No gallop. No click. No rub.   Pulses:     Intact distal pulses.   Edema:     Peripheral edema absent.   Abdominal:      General: Bowel sounds are normal.      Palpations: Abdomen is soft.      Tenderness: There is no abdominal tenderness.   Musculoskeletal: Normal range of motion.         General: No tenderness. Skin:     General: Skin is warm and dry.   Neurological:      General: No focal deficit present.      Mental Status: Alert and oriented to person, place and time.       Blood pressure 118/82, pulse 90, height 167.6 cm (66\"), weight 82.6 kg (182 lb), SpO2 99 %.   Lab Review:     Assessment:       1. Acute idiopathic myocarditis  No evidence of symptomatic recurrence.  Possibly related to COVID-19 booster vaccine administration.  No known history of prior COVID infection.  Viral myocarditis due to other viral species such as coxsackie, adeno, etc. remains a possibility.  Continued steady improvement in overall LV systolic function with guideline directed medical therapy.    2. Essential hypertension  Excellent blood pressure control.    Procedures    Plan:     I have informed the patient that there is the possibility she may have had a booster vaccine related episode of myocarditis.  Because of this possibility and no way to definitively exclude this as an etiology, I have expressed reluctance that she should have further COVID vaccination.    The patient has been advised that she can discontinue Lipitor therapy at this time.    I have recommended continuing Coreg and Diovan.  Discontinuation of this medication could potentially lead to a relapse if her underlying pathophysiology is idiopathic dilated cardiomyopathy.    She is encouraged to maintain an active " lifestyle.    No further cardiovascular testing is warranted at this time.

## 2023-03-10 ENCOUNTER — TELEPHONE (OUTPATIENT)
Dept: INTERNAL MEDICINE CLINIC | Age: 43
End: 2023-03-10

## 2023-03-10 ENCOUNTER — OFFICE VISIT (OUTPATIENT)
Dept: INTERNAL MEDICINE CLINIC | Age: 43
End: 2023-03-10
Payer: OTHER GOVERNMENT

## 2023-03-10 VITALS
SYSTOLIC BLOOD PRESSURE: 130 MMHG | HEART RATE: 86 BPM | WEIGHT: 177 LBS | BODY MASS INDEX: 28.45 KG/M2 | OXYGEN SATURATION: 99 % | HEIGHT: 66 IN | DIASTOLIC BLOOD PRESSURE: 110 MMHG

## 2023-03-10 DIAGNOSIS — B35.1 TOENAIL FUNGUS: ICD-10-CM

## 2023-03-10 DIAGNOSIS — Z86.79 HISTORY OF MYOCARDITIS: ICD-10-CM

## 2023-03-10 DIAGNOSIS — I10 PRIMARY HYPERTENSION: Primary | ICD-10-CM

## 2023-03-10 DIAGNOSIS — I10 PRIMARY HYPERTENSION: ICD-10-CM

## 2023-03-10 PROBLEM — Z98.84 HX OF BARIATRIC SURGERY: Status: ACTIVE | Noted: 2023-03-10

## 2023-03-10 LAB
A/G RATIO: 1.4 (ref 1.1–2.2)
ALBUMIN SERPL-MCNC: 4 G/DL (ref 3.4–5)
ALP BLD-CCNC: 92 U/L (ref 40–129)
ALT SERPL-CCNC: 9 U/L (ref 10–40)
ANION GAP SERPL CALCULATED.3IONS-SCNC: 8 MMOL/L (ref 3–16)
AST SERPL-CCNC: 15 U/L (ref 15–37)
BASOPHILS ABSOLUTE: 0 K/UL (ref 0–0.2)
BASOPHILS RELATIVE PERCENT: 0.7 %
BILIRUB SERPL-MCNC: 0.5 MG/DL (ref 0–1)
BUN BLDV-MCNC: 11 MG/DL (ref 7–20)
CALCIUM SERPL-MCNC: 9.1 MG/DL (ref 8.3–10.6)
CHLORIDE BLD-SCNC: 104 MMOL/L (ref 99–110)
CHOLESTEROL, TOTAL: 243 MG/DL (ref 0–199)
CO2: 25 MMOL/L (ref 21–32)
CREAT SERPL-MCNC: 1.2 MG/DL (ref 0.6–1.1)
EOSINOPHILS ABSOLUTE: 0 K/UL (ref 0–0.6)
EOSINOPHILS RELATIVE PERCENT: 1.1 %
GFR SERPL CREATININE-BSD FRML MDRD: 57 ML/MIN/{1.73_M2}
GLUCOSE BLD-MCNC: 87 MG/DL (ref 70–99)
HCT VFR BLD CALC: 35.8 % (ref 36–48)
HDLC SERPL-MCNC: 80 MG/DL (ref 40–60)
HEMOGLOBIN: 11.7 G/DL (ref 12–16)
LDL CHOLESTEROL CALCULATED: 153 MG/DL
LYMPHOCYTES ABSOLUTE: 1.2 K/UL (ref 1–5.1)
LYMPHOCYTES RELATIVE PERCENT: 32.5 %
MCH RBC QN AUTO: 26.9 PG (ref 26–34)
MCHC RBC AUTO-ENTMCNC: 32.7 G/DL (ref 31–36)
MCV RBC AUTO: 82.1 FL (ref 80–100)
MONOCYTES ABSOLUTE: 0.4 K/UL (ref 0–1.3)
MONOCYTES RELATIVE PERCENT: 10.3 %
NEUTROPHILS ABSOLUTE: 2.1 K/UL (ref 1.7–7.7)
NEUTROPHILS RELATIVE PERCENT: 55.4 %
PDW BLD-RTO: 15.6 % (ref 12.4–15.4)
PLATELET # BLD: 247 K/UL (ref 135–450)
PMV BLD AUTO: 9.8 FL (ref 5–10.5)
POTASSIUM SERPL-SCNC: 4.8 MMOL/L (ref 3.5–5.1)
RBC # BLD: 4.35 M/UL (ref 4–5.2)
SODIUM BLD-SCNC: 137 MMOL/L (ref 136–145)
TOTAL PROTEIN: 6.8 G/DL (ref 6.4–8.2)
TRIGL SERPL-MCNC: 52 MG/DL (ref 0–150)
VLDLC SERPL CALC-MCNC: 10 MG/DL
WBC # BLD: 3.8 K/UL (ref 4–11)

## 2023-03-10 PROCEDURE — 99204 OFFICE O/P NEW MOD 45 MIN: CPT | Performed by: NURSE PRACTITIONER

## 2023-03-10 PROCEDURE — 3080F DIAST BP >= 90 MM HG: CPT | Performed by: NURSE PRACTITIONER

## 2023-03-10 PROCEDURE — 3075F SYST BP GE 130 - 139MM HG: CPT | Performed by: NURSE PRACTITIONER

## 2023-03-10 RX ORDER — BUPROPION HYDROCHLORIDE 150 MG/1
TABLET ORAL DAILY
COMMUNITY

## 2023-03-10 RX ORDER — VALSARTAN 40 MG/1
TABLET ORAL 2 TIMES DAILY
COMMUNITY
Start: 2022-06-16 | End: 2023-03-10 | Stop reason: SDUPTHER

## 2023-03-10 RX ORDER — CARVEDILOL 6.25 MG/1
TABLET ORAL 2 TIMES DAILY
COMMUNITY
Start: 2022-06-16

## 2023-03-10 RX ORDER — OMEPRAZOLE 20 MG/1
CAPSULE, DELAYED RELEASE ORAL DAILY
COMMUNITY

## 2023-03-10 RX ORDER — ASPIRIN 81 MG/1
TABLET ORAL DAILY
COMMUNITY
Start: 2022-06-16

## 2023-03-10 RX ORDER — FLUCONAZOLE 150 MG/1
TABLET ORAL
COMMUNITY
Start: 2023-01-11 | End: 2023-03-10

## 2023-03-10 RX ORDER — HYDROCHLOROTHIAZIDE 25 MG/1
TABLET ORAL
COMMUNITY
End: 2023-03-10

## 2023-03-10 RX ORDER — VALSARTAN 80 MG/1
80 TABLET ORAL 2 TIMES DAILY
Qty: 60 TABLET | Refills: 2 | Status: SHIPPED | OUTPATIENT
Start: 2023-03-10

## 2023-03-10 RX ORDER — HYDROXYZINE HYDROCHLORIDE 10 MG/1
TABLET, FILM COATED ORAL EVERY 4 HOURS PRN
COMMUNITY

## 2023-03-10 RX ORDER — FLUOXETINE HYDROCHLORIDE 20 MG/1
CAPSULE ORAL DAILY
COMMUNITY

## 2023-03-10 ASSESSMENT — PATIENT HEALTH QUESTIONNAIRE - PHQ9
SUM OF ALL RESPONSES TO PHQ QUESTIONS 1-9: 0
SUM OF ALL RESPONSES TO PHQ QUESTIONS 1-9: 0
SUM OF ALL RESPONSES TO PHQ9 QUESTIONS 1 & 2: 0
2. FEELING DOWN, DEPRESSED OR HOPELESS: 0
1. LITTLE INTEREST OR PLEASURE IN DOING THINGS: 0
SUM OF ALL RESPONSES TO PHQ QUESTIONS 1-9: 0
SUM OF ALL RESPONSES TO PHQ QUESTIONS 1-9: 0

## 2023-03-10 ASSESSMENT — ENCOUNTER SYMPTOMS
GASTROINTESTINAL NEGATIVE: 1
SHORTNESS OF BREATH: 0
COUGH: 1

## 2023-03-10 NOTE — PROGRESS NOTES
Patient: Barbi Spicer is a 38 y.o. female who presents today with the following Chief Complaint(s):  Chief Complaint   Patient presents with    New Patient    Hypertension    Nail Problem       HPI  Presents to the office as a new patient to establish care. Recently moved here from [place]. Complaining of possible toenail fungus and elevated blood pressure.    -Toenail fungus started several months ago. Right greater toenail. Denies any pain. - Has a history of hypertension. Also has a history of myocarditis which was most likely related to the COVID-vaccine almost 1 year ago. Was followed by cardiology. Currently taking carvedilol and valsartan. Says she had high blood pressure before this and was on medication but feels like the medication is not effective anymore.  -Had bariatric surgery in 2019.  -History of gestational diabetes    Hypertension  This is a chronic problem. The current episode started more than 1 year ago. The problem has been gradually worsening since onset. Pertinent negatives include no chest pain, neck pain, peripheral edema or shortness of breath. Risk factors: myocarditis r/t covid vaccine. Past treatments include beta blockers, angiotensin blockers and ACE inhibitors. The current treatment provides moderate (Blood pressures have recently been elevated) improvement. Compliance problems include diet.         Current Outpatient Medications   Medication Sig Dispense Refill    aspirin 81 MG EC tablet Take by mouth daily      buPROPion (WELLBUTRIN XL) 150 MG extended release tablet Take by mouth daily      carvedilol (COREG) 6.25 MG tablet Take by mouth 2 times daily      FLUoxetine (PROZAC) 20 MG capsule Take by mouth daily      hydrOXYzine HCl (ATARAX) 10 MG tablet Take by mouth every 4 hours as needed      omeprazole (PRILOSEC) 20 MG delayed release capsule Take by mouth daily      valsartan (DIOVAN) 80 MG tablet Take 1 tablet by mouth 2 times daily 60 tablet 2     No current facility-administered medications for this visit. Patient's past medical history, surgical history, family history, medications,  and allergies  were all reviewed and updated as appropriate today. Patient Active Problem List   Diagnosis    Essential hypertension    Hx of bariatric surgery     Past Medical History:   Diagnosis Date    Gestational diabetes     Hypertension     Myocarditis ([address]       Past Surgical History:   Procedure Laterality Date    BARIATRIC SURGERY      CARDIAC CATHETERIZATION      ENDOMETRIAL ABLATION      LEEP         No family history on file. No Known Allergies      Review of Systems   Constitutional: Negative. HENT:  Positive for congestion. Sore throat: itchy throat several days ago. Respiratory:  Positive for cough (occasional). Negative for shortness of breath. Cardiovascular:  Negative for chest pain. Gastrointestinal: Negative. GERD managed with omeprazole   Genitourinary: Negative. Musculoskeletal: Negative. Negative for neck pain. Skin: Negative. Neurological: Negative. Hematological: Negative. Psychiatric/Behavioral:          Depression and anxiety managed with medication     Vitals:    03/10/23 0825   BP: (!) 130/110   Pulse: 86   SpO2: 99%   Weight: 177 lb (80.3 kg)   Height: 5' 6\" (1.676 m)     Physical Exam  Constitutional:       General: She is not in acute distress. Appearance: Normal appearance. She is not ill-appearing, toxic-appearing or diaphoretic. HENT:      Head: Normocephalic. Eyes:      Conjunctiva/sclera: Conjunctivae normal.   Cardiovascular:      Rate and Rhythm: Normal rate and regular rhythm. Pulses: Normal pulses. Heart sounds: Normal heart sounds. No murmur heard. No friction rub. No gallop. Pulmonary:      Effort: Pulmonary effort is normal. No respiratory distress. Breath sounds: Normal breath sounds. No stridor. No wheezing, rhonchi or rales.    Abdominal:      General: Bowel sounds are normal. There is no distension. Palpations: Abdomen is soft. There is no mass. Tenderness: There is no abdominal tenderness. There is no guarding. Hernia: No hernia is present. Musculoskeletal:         General: Normal range of motion. Cervical back: Normal range of motion and neck supple. No rigidity or tenderness. Feet:    Feet:      Right foot:      Toenail Condition: Right toenails are abnormally thick. Fungal disease present. Lymphadenopathy:      Cervical: No cervical adenopathy. Skin:     General: Skin is warm and dry. Neurological:      Mental Status: She is alert and oriented to person, place, and time. Psychiatric:         Mood and Affect: Mood normal.         Behavior: Behavior normal.         Thought Content: Thought content normal.         Judgment: Judgment normal.          Assessment/Plan:  1. Primary hypertension  - valsartan (DIOVAN) 80 MG tablet; Take 1 tablet by mouth 2 times daily  Dispense: 60 tablet; Refill: 2  - Continue carvedilol 6.25 mg twice a day  - CBC with Auto Differential; Future  - Comprehensive Metabolic Panel; Future  - LIPID PANEL; Future  - Hemoglobin A1C; Future    2. Toenail fungus  - [facility] - Tristan Velasquez DPM, Podiatry, [facility]  - Hemoglobin A1C; Future    3. History of myocarditis  - CBC with Auto Differential; Future  - Comprehensive Metabolic Panel; Future  - LIPID PANEL; Future  - Hemoglobin A1C; Future      Return in about 1 month (around 4/10/2023), or if symptoms worsen or fail to improve.

## 2023-03-10 NOTE — PATIENT INSTRUCTIONS
Increase the valsartan to 80 mg twice a day  Eat a low sodium diet  Continue carvedilol 6.25 mg twice a day  Keep a log of your blood pressure and send your numbers through [employer]  Follow up in 1 month for an office visit  Schedule appointment with podiatrist    [place] [facility] Locations - No appointment necessary. @ indicates the location is open Saturdays in addition to Monday through Friday. Call your preferred location for test preparation, business hours and other information you need. [employer] accepts [employer]. [facility]     @ 1325 [facility] [address] [facility]. [place], [address] [place] [address] Ave    Ph: [address]   650 [facility], 6500 [facility] [address]    Ph: [phone]   @ [address] [place] [address].[facility]    Ph: Carol [address]    Ph: [phone]  @ [address] [place] [address], [address] [place] [address]   Ph: [phone]  @ [address] [place] [address]. Eric Rinaldi [address]    Ph: [address] 297 [facility]   Ph: [phone]    [place]    @ [facility]. [place], [place] [address]    Ph: [phone]  [facility]   3280 Vinod Zarate [facility], [address]   Ph: [place] [address] 30: 311 [facility] Douglas Connelly    Ph: [phone]   [facility]   [address] 2026 [facility]. Douglas Aguillon   Ph: 501 [facility]  [address] [place], [place] [address]    Ph: [phone]

## 2023-03-11 LAB
ESTIMATED AVERAGE GLUCOSE: 105.4 MG/DL
HBA1C MFR BLD: 5.3 %

## 2023-05-25 ENCOUNTER — OFFICE VISIT (OUTPATIENT)
Dept: INTERNAL MEDICINE CLINIC | Age: 43
End: 2023-05-25
Payer: OTHER GOVERNMENT

## 2023-05-25 VITALS
OXYGEN SATURATION: 99 % | HEIGHT: 66 IN | DIASTOLIC BLOOD PRESSURE: 108 MMHG | BODY MASS INDEX: 28.57 KG/M2 | TEMPERATURE: 98.3 F | SYSTOLIC BLOOD PRESSURE: 140 MMHG | HEART RATE: 91 BPM

## 2023-05-25 DIAGNOSIS — F41.1 GAD (GENERALIZED ANXIETY DISORDER): ICD-10-CM

## 2023-05-25 DIAGNOSIS — F33.1 MODERATE EPISODE OF RECURRENT MAJOR DEPRESSIVE DISORDER (HCC): ICD-10-CM

## 2023-05-25 DIAGNOSIS — I10 PRIMARY HYPERTENSION: ICD-10-CM

## 2023-05-25 DIAGNOSIS — R53.83 FATIGUE, UNSPECIFIED TYPE: Primary | ICD-10-CM

## 2023-05-25 DIAGNOSIS — Z87.898 H/O MOTION SICKNESS: ICD-10-CM

## 2023-05-25 DIAGNOSIS — D50.9 IRON DEFICIENCY ANEMIA, UNSPECIFIED IRON DEFICIENCY ANEMIA TYPE: ICD-10-CM

## 2023-05-25 PROCEDURE — 99214 OFFICE O/P EST MOD 30 MIN: CPT | Performed by: NURSE PRACTITIONER

## 2023-05-25 PROCEDURE — 3077F SYST BP >= 140 MM HG: CPT | Performed by: NURSE PRACTITIONER

## 2023-05-25 PROCEDURE — 3080F DIAST BP >= 90 MM HG: CPT | Performed by: NURSE PRACTITIONER

## 2023-05-25 RX ORDER — FLUOXETINE HYDROCHLORIDE 20 MG/1
20 CAPSULE ORAL DAILY
Qty: 30 CAPSULE | Refills: 5 | Status: SHIPPED | OUTPATIENT
Start: 2023-05-25 | End: 2023-05-25

## 2023-05-25 RX ORDER — BUPROPION HYDROCHLORIDE 300 MG/1
TABLET ORAL
COMMUNITY
Start: 2023-03-22 | End: 2023-05-25 | Stop reason: SDUPTHER

## 2023-05-25 RX ORDER — OMEPRAZOLE 20 MG/1
20 CAPSULE, DELAYED RELEASE ORAL DAILY
Qty: 30 CAPSULE | Refills: 5 | Status: SHIPPED | OUTPATIENT
Start: 2023-05-25

## 2023-05-25 RX ORDER — BUPROPION HYDROCHLORIDE 300 MG/1
300 TABLET ORAL DAILY
Qty: 30 TABLET | Refills: 5 | Status: SHIPPED | OUTPATIENT
Start: 2023-05-25

## 2023-05-25 RX ORDER — FLUCONAZOLE 150 MG/1
TABLET ORAL
COMMUNITY
Start: 2023-03-27 | End: 2023-05-25

## 2023-05-25 RX ORDER — SCOLOPAMINE TRANSDERMAL SYSTEM 1 MG/1
1 PATCH, EXTENDED RELEASE TRANSDERMAL
Qty: 10 PATCH | Refills: 0 | Status: SHIPPED | OUTPATIENT
Start: 2023-05-25

## 2023-05-25 RX ORDER — CLOTRIMAZOLE 1 G/ML
SOLUTION TOPICAL
COMMUNITY
Start: 2023-03-27 | End: 2023-05-25

## 2023-05-25 RX ORDER — FLUOXETINE HYDROCHLORIDE 40 MG/1
40 CAPSULE ORAL DAILY
Qty: 30 CAPSULE | Refills: 5 | Status: SHIPPED | OUTPATIENT
Start: 2023-05-25

## 2023-05-25 SDOH — ECONOMIC STABILITY: HOUSING INSECURITY
IN THE LAST 12 MONTHS, WAS THERE A TIME WHEN YOU DID NOT HAVE A STEADY PLACE TO SLEEP OR SLEPT IN A SHELTER (INCLUDING NOW)?: NO

## 2023-05-25 SDOH — ECONOMIC STABILITY: FOOD INSECURITY: WITHIN THE PAST 12 MONTHS, YOU WORRIED THAT YOUR FOOD WOULD RUN OUT BEFORE YOU GOT MONEY TO BUY MORE.: NEVER TRUE

## 2023-05-25 SDOH — ECONOMIC STABILITY: INCOME INSECURITY: HOW HARD IS IT FOR YOU TO PAY FOR THE VERY BASICS LIKE FOOD, HOUSING, MEDICAL CARE, AND HEATING?: NOT VERY HARD

## 2023-05-25 SDOH — ECONOMIC STABILITY: FOOD INSECURITY: WITHIN THE PAST 12 MONTHS, THE FOOD YOU BOUGHT JUST DIDN'T LAST AND YOU DIDN'T HAVE MONEY TO GET MORE.: NEVER TRUE

## 2023-05-25 ASSESSMENT — ENCOUNTER SYMPTOMS
NAUSEA: 1
SHORTNESS OF BREATH: 0

## 2023-05-25 NOTE — PATIENT INSTRUCTIONS
Do not eat or drink anything at least 10 hours before having your labs drawn. I will send a message or call if your lab work is abnormal.    [facility] Laboratory Locations - No appointment necessary. @ indicates the location is open Saturdays in addition to Monday through Friday. Call your preferred location for test preparation, business hours and other information you need. [employer] accepts [employer]. [facility]     @ [address] [place] [address] Road. [address] [place] [address], [address] [place] [address]    Ph: 28 [facility], 6500 [facility] [address]    Ph: [phone]   @ [address] [place] [address].,    [facility]    Ph: Carol [address]    Ph: [phone]  @ [address] [place] [address]   Ph: [phone]  @ [address] [place] [address]. Eric Rinaldi [address]    Ph: [address] [place], [place] [address] 19   Ph: [phone]    [place]    @ [facility]. [place], [place] [address]    Ph: [phone]  [address]   Ph: Rickie 84 Esther [facility]. [address] 30: 311 [facility] Douglas Connelly    Ph: [phone]   [facility]   [address] 2026 [facility]. Douglas Aguillon   Ph: 501 [facility]  [address] [place], [place] [address]    Ph: [phone]

## 2023-05-25 NOTE — PROGRESS NOTES
Hypertension     Myocarditis ([facility] [address]       Past Surgical History:   Procedure Laterality Date    BARIATRIC SURGERY      CARDIAC CATHETERIZATION      ENDOMETRIAL ABLATION      LEEP         No family history on file. Allergies   Allergen Reactions    Adhesive Tape     Debby-D [Diphenhydramine]          Review of Systems   Constitutional:  Positive for fatigue. HENT: Negative. Respiratory:  Negative for shortness of breath. Cardiovascular:  Negative for chest pain. Gastrointestinal:  Positive for nausea (Will be going on a cruise soon and is requesting scopolamine patches for motion sickness. Says she gets very carsick as well and this can last several days after her trip is over. ). Genitourinary: Negative. Musculoskeletal:  Negative for neck pain. Neurological: Negative. Hematological:         LYNN-received iron infusion in the past which she says helped. Only had it once. Unable to tolerate iron pills due to it causing constipation and upset stomach   Psychiatric/Behavioral:  Positive for dysphoric mood. Negative for self-injury and suicidal ideas. The patient is nervous/anxious. Anxiety and depression-takes Prozac, Wellbutrin, and hydroxyzine but not consistently     Vitals:    05/25/23 1037 05/25/23 1052   BP: (!) 140/108 (!) 140/108   Site: Left Upper Arm Left Upper Arm   Position: Sitting Sitting   Cuff Size: Medium Adult Medium Adult   Pulse: 91    Temp: 98.3 °F (36.8 °C)    TempSrc: Oral    SpO2: 99%    Height: 5' 6\" (1.676 m)      Physical Exam  Constitutional:       General: She is not in acute distress. Appearance: Normal appearance. She is not ill-appearing, toxic-appearing or diaphoretic. HENT:      Head: Normocephalic. Eyes:      Conjunctiva/sclera: Conjunctivae normal.   Cardiovascular:      Rate and Rhythm: Normal rate and regular rhythm. Pulses: Normal pulses. Heart sounds: Normal heart sounds. No murmur heard. No friction rub. No gallop.    Pulmonary:

## 2023-05-31 DIAGNOSIS — R53.83 FATIGUE, UNSPECIFIED TYPE: ICD-10-CM

## 2023-05-31 DIAGNOSIS — D50.9 IRON DEFICIENCY ANEMIA, UNSPECIFIED IRON DEFICIENCY ANEMIA TYPE: ICD-10-CM

## 2023-06-01 LAB
FERRITIN SERPL IA-MCNC: 14.6 NG/ML (ref 15–150)
FOLATE SERPL-MCNC: 6.84 NG/ML (ref 4.78–24.2)
IRON SATN MFR SERPL: 22 % (ref 15–50)
IRON SERPL-MCNC: 89 UG/DL (ref 37–145)
TIBC SERPL-MCNC: 403 UG/DL (ref 260–445)
VIT B12 SERPL-MCNC: 433 PG/ML (ref 211–911)

## 2023-08-04 DIAGNOSIS — I10 PRIMARY HYPERTENSION: ICD-10-CM

## 2023-08-07 DIAGNOSIS — I10 PRIMARY HYPERTENSION: ICD-10-CM

## 2023-08-07 RX ORDER — VALSARTAN 80 MG/1
TABLET ORAL
Qty: 60 TABLET | Refills: 2 | Status: SHIPPED | OUTPATIENT
Start: 2023-08-07

## 2023-08-07 RX ORDER — VALSARTAN 80 MG/1
TABLET ORAL
Qty: 180 TABLET | OUTPATIENT
Start: 2023-08-07

## 2023-10-12 RX ORDER — FLUOXETINE HYDROCHLORIDE 20 MG/1
20 CAPSULE ORAL DAILY
COMMUNITY
Start: 2023-07-27 | End: 2023-10-13

## 2023-10-13 ENCOUNTER — OFFICE VISIT (OUTPATIENT)
Dept: INTERNAL MEDICINE CLINIC | Age: 43
End: 2023-10-13
Payer: OTHER GOVERNMENT

## 2023-10-13 VITALS
SYSTOLIC BLOOD PRESSURE: 140 MMHG | DIASTOLIC BLOOD PRESSURE: 90 MMHG | OXYGEN SATURATION: 99 % | HEART RATE: 82 BPM | BODY MASS INDEX: 28.12 KG/M2 | WEIGHT: 174.2 LBS | TEMPERATURE: 98.6 F

## 2023-10-13 DIAGNOSIS — F33.1 MODERATE EPISODE OF RECURRENT MAJOR DEPRESSIVE DISORDER (HCC): ICD-10-CM

## 2023-10-13 DIAGNOSIS — I10 PRIMARY HYPERTENSION: Primary | ICD-10-CM

## 2023-10-13 DIAGNOSIS — F41.1 GAD (GENERALIZED ANXIETY DISORDER): ICD-10-CM

## 2023-10-13 DIAGNOSIS — Z12.4 CERVICAL CANCER SCREENING: ICD-10-CM

## 2023-10-13 PROCEDURE — 3075F SYST BP GE 130 - 139MM HG: CPT | Performed by: NURSE PRACTITIONER

## 2023-10-13 PROCEDURE — 3080F DIAST BP >= 90 MM HG: CPT | Performed by: NURSE PRACTITIONER

## 2023-10-13 PROCEDURE — 99214 OFFICE O/P EST MOD 30 MIN: CPT | Performed by: NURSE PRACTITIONER

## 2023-10-13 RX ORDER — GLYCERIN ADULT
1 SUPPOSITORY, RECTAL RECTAL DAILY
Qty: 1 EACH | Refills: 0 | Status: SHIPPED | OUTPATIENT
Start: 2023-10-13

## 2023-10-13 RX ORDER — BUPROPION HYDROCHLORIDE 300 MG/1
300 TABLET ORAL DAILY
Qty: 30 TABLET | Refills: 5 | Status: SHIPPED | OUTPATIENT
Start: 2023-10-13

## 2023-10-13 RX ORDER — VALSARTAN 80 MG/1
80 TABLET ORAL 2 TIMES DAILY
Qty: 60 TABLET | Refills: 5 | Status: SHIPPED | OUTPATIENT
Start: 2023-10-13

## 2023-10-13 RX ORDER — HYDROXYZINE HYDROCHLORIDE 10 MG/1
10 TABLET, FILM COATED ORAL EVERY 4 HOURS PRN
Qty: 30 TABLET | Refills: 5 | Status: SHIPPED | OUTPATIENT
Start: 2023-10-13

## 2023-10-13 RX ORDER — FLUOXETINE HYDROCHLORIDE 40 MG/1
40 CAPSULE ORAL DAILY
Qty: 30 CAPSULE | Refills: 5 | Status: SHIPPED | OUTPATIENT
Start: 2023-10-13

## 2023-10-13 RX ORDER — OMEPRAZOLE 20 MG/1
20 CAPSULE, DELAYED RELEASE ORAL DAILY
Qty: 30 CAPSULE | Refills: 5 | Status: SHIPPED | OUTPATIENT
Start: 2023-10-13

## 2023-10-13 RX ORDER — CARVEDILOL 6.25 MG/1
6.25 TABLET ORAL 2 TIMES DAILY
Qty: 60 TABLET | Refills: 5 | Status: SHIPPED | OUTPATIENT
Start: 2023-10-13

## 2023-10-13 RX ORDER — ASPIRIN 81 MG/1
81 TABLET ORAL DAILY
Qty: 30 TABLET | Refills: 5 | Status: SHIPPED | OUTPATIENT
Start: 2023-10-13

## 2023-10-13 ASSESSMENT — ENCOUNTER SYMPTOMS
GASTROINTESTINAL NEGATIVE: 1
SHORTNESS OF BREATH: 0
RESPIRATORY NEGATIVE: 1

## 2023-10-13 NOTE — PATIENT INSTRUCTIONS
Dr. Dick Katelin your blood pressure at least once a day for 7 days    989 [facility] Laboratory Locations - No appointment necessary. ? indicates the location is open Saturdays in addition to Monday through Friday. Call your preferred location for test preparation, business hours and other information you need. [employer] accepts [employer]. CENTRAL  EAST  [place]    ? [place] [id number] 6645 [facility]. [facility], [address]    Ph: 2000 [address] [place], [address]    Ph: [phone]   ? [address]    [place], [address] [place] [address]    Ph: 1700 [facility] [address] [place], [address] [facility]    Ph: [phone] ? [place]   [address]   Ph: [phone]  ? 707 [facility], 211 [facility]    Ph: [place] 201 [facility], 1235 [facility]   Ph: [phone]    NORTH    ? [place], [place] [address]    Ph: [phone]  [facility]   1221 [facility], [facility]   Ph: Renata Daniel, [address] [facility] Cent[facility]ard: 882 [address], [facility]    Ph: 713 [facility].  [address] [place] [address]    Ph: [phone]

## 2023-10-13 NOTE — PROGRESS NOTES
gallop. Pulmonary:      Effort: Pulmonary effort is normal. No respiratory distress. Breath sounds: Normal breath sounds. No stridor. No wheezing, rhonchi or rales. Abdominal:      Palpations: Abdomen is soft. Musculoskeletal:         General: Normal range of motion. Cervical back: Normal range of motion and neck supple. Lymphadenopathy:      Cervical: No cervical adenopathy. Skin:     General: Skin is warm and dry. Neurological:      Mental Status: She is alert and oriented to person, place, and time. Psychiatric:         Mood and Affect: Mood normal.         Behavior: Behavior normal.         Thought Content: Thought content normal.         Judgment: Judgment normal.            Assessment/Plan:  1. Primary hypertension  - Discussed eating a low-sodium diet and exercising regularly  - valsartan (DIOVAN) 80 MG tablet; Take 1 tablet by mouth 2 times daily  Dispense: 60 tablet; Refill: 5  - carvedilol (COREG) 6.25 MG tablet; Take 1 tablet by mouth 2 times daily  Dispense: 60 tablet; Refill: 5  - Blood Pressure Monitoring (BLOOD PRESSURE MONITOR/ARM) TERRIE; 1 each by Does not apply route daily  Dispense: 1 each; Refill: 0    2. Moderate episode of recurrent major depressive disorder (HCC)  - buPROPion (WELLBUTRIN XL) 300 MG extended release tablet; Take 1 tablet by mouth daily  Dispense: 30 tablet; Refill: 5  - FLUoxetine (PROZAC) 40 MG capsule; Take 1 capsule by mouth daily  Dispense: 30 capsule; Refill: 5    3. RAVEN (generalized anxiety disorder)  - FLUoxetine (PROZAC) 40 MG capsule; Take 1 capsule by mouth daily  Dispense: 30 capsule; Refill: 5    4. Cervical cancer screening  - Norma Overton MD, Gynecology, [facility]        Return in 3 months (on 1/13/2024), or if symptoms worsen or fail to improve. This dictation was generated by voice recognition computer software.   Although all attempts are made to edit the dictation for accuracy, there may be errors in the transcription

## 2023-10-26 ENCOUNTER — OFFICE VISIT (OUTPATIENT)
Dept: GYNECOLOGY | Age: 43
End: 2023-10-26
Payer: OTHER GOVERNMENT

## 2023-10-26 VITALS
DIASTOLIC BLOOD PRESSURE: 70 MMHG | HEART RATE: 68 BPM | BODY MASS INDEX: 27.48 KG/M2 | HEIGHT: 66 IN | WEIGHT: 171 LBS | RESPIRATION RATE: 17 BRPM | SYSTOLIC BLOOD PRESSURE: 120 MMHG

## 2023-10-26 DIAGNOSIS — N89.8 VAGINAL DRYNESS: ICD-10-CM

## 2023-10-26 DIAGNOSIS — Z01.419 WELL WOMAN EXAM WITH ROUTINE GYNECOLOGICAL EXAM: Primary | ICD-10-CM

## 2023-10-26 DIAGNOSIS — N76.0 RECURRENT VAGINITIS: ICD-10-CM

## 2023-10-26 DIAGNOSIS — N92.6 IRREGULAR MENSTRUAL BLEEDING: ICD-10-CM

## 2023-10-26 DIAGNOSIS — N85.2 ENLARGED UTERUS: ICD-10-CM

## 2023-10-26 PROCEDURE — 3074F SYST BP LT 130 MM HG: CPT | Performed by: OBSTETRICS & GYNECOLOGY

## 2023-10-26 PROCEDURE — 99386 PREV VISIT NEW AGE 40-64: CPT | Performed by: OBSTETRICS & GYNECOLOGY

## 2023-10-26 PROCEDURE — 3078F DIAST BP <80 MM HG: CPT | Performed by: OBSTETRICS & GYNECOLOGY

## 2023-10-26 RX ORDER — ESTRADIOL 10 UG/1
10 INSERT VAGINAL
Qty: 24 TABLET | Refills: 3 | Status: SHIPPED | OUTPATIENT
Start: 2023-10-26 | End: 2023-10-26

## 2023-10-26 RX ORDER — ESTRADIOL 10 UG/1
INSERT VAGINAL
Qty: 25 TABLET | Refills: 3 | Status: SHIPPED | OUTPATIENT
Start: 2023-10-26

## 2023-10-26 ASSESSMENT — ENCOUNTER SYMPTOMS
GASTROINTESTINAL NEGATIVE: 1
RESPIRATORY NEGATIVE: 1
ALLERGIC/IMMUNOLOGIC NEGATIVE: 1
EYES NEGATIVE: 1

## 2023-10-27 LAB
EST. AVERAGE GLUCOSE BLD GHB EST-MCNC: 108.3 MG/DL
ESTRADIOL SERPL-MCNC: 316 PG/ML
FSH SERPL-ACNC: 5.3 MIU/ML
HBA1C MFR BLD: 5.4 %

## 2023-10-27 NOTE — PROGRESS NOTES
Insecurity: No Food Insecurity (5/25/2023)    Hunger Vital Sign     Worried About Running Out of Food in the Last Year: Never true     Ran Out of Food in the Last Year: Never true   Transportation Needs: Unknown (5/25/2023)    PRAPARE - Transportation     Lack of Transportation (Medical): Not on file     Lack of Transportation (Non-Medical):  No   Physical Activity: Not on file   Stress: Not on file   Social Connections: Not on file   Intimate Partner Violence: Not on file   Housing Stability: Unknown (5/25/2023)    Housing Stability Vital Sign     Unable to Pay for Housing in the Last Year: Not on file     Number of [address] in the Last Year: Not on file     Unstable Housing in the Last Year: No     Allergies   Allergen Reactions    Adhesive Tape Rash    Debby-D [Diphenhydramine] Other (See Comments)     Outpatient Medications Marked as Taking for the 10/26/23 encounter (Office Visit) with Jolanta Hansen MD   Medication Sig Dispense Refill    [DISCONTINUED] Estradiol (VAGIFEM) 10 MCG TABS vaginal tablet Place 1 tablet vaginally Twice a Week 24 tablet 3    valsartan (DIOVAN) 80 MG tablet Take 1 tablet by mouth 2 times daily 60 tablet 5    carvedilol (COREG) 6.25 MG tablet Take 1 tablet by mouth 2 times daily 60 tablet 5    hydrOXYzine HCl (ATARAX) 10 MG tablet Take 1 tablet by mouth every 4 hours as needed for Anxiety (sleep) 30 tablet 5    buPROPion (WELLBUTRIN XL) 300 MG extended release tablet Take 1 tablet by mouth daily 30 tablet 5    aspirin 81 MG EC tablet Take 1 tablet by mouth daily 30 tablet 5    FLUoxetine (PROZAC) 40 MG capsule Take 1 capsule by mouth daily 30 capsule 5    omeprazole (PRILOSEC) 20 MG delayed release capsule Take 1 capsule by mouth daily 30 capsule 5    Blood Pressure Monitoring (BLOOD PRESSURE MONITOR/ARM) TERRIE 1 each by Does not apply route daily 1 each 0     Family History   Problem Relation Age of Onset    Diabetes Mother     Kidney Disease Mother     Mental Illness Mother

## 2023-10-29 LAB — TESTOST SERPL-MCNC: 25 NG/DL (ref 20–70)

## 2023-11-06 DIAGNOSIS — B37.31 YEAST VAGINITIS: Primary | ICD-10-CM

## 2023-11-06 RX ORDER — FLUCONAZOLE 150 MG/1
150 TABLET ORAL
Qty: 3 TABLET | Refills: 3 | Status: SHIPPED | OUTPATIENT
Start: 2023-11-06

## 2023-11-08 ENCOUNTER — HOSPITAL ENCOUNTER (OUTPATIENT)
Dept: WOMENS IMAGING | Age: 43
Discharge: HOME | End: 2023-11-08
Attending: OBSTETRICS & GYNECOLOGY
Payer: OTHER GOVERNMENT

## 2023-11-08 ENCOUNTER — HOSPITAL ENCOUNTER (OUTPATIENT)
Dept: ULTRASOUND IMAGING | Age: 43
Discharge: HOME | End: 2023-11-08
Attending: OBSTETRICS & GYNECOLOGY
Payer: OTHER GOVERNMENT

## 2023-11-08 VITALS — WEIGHT: 170 LBS | HEIGHT: 66 IN | BODY MASS INDEX: 27.32 KG/M2

## 2023-11-08 DIAGNOSIS — Z01.419 WELL WOMAN EXAM WITH ROUTINE GYNECOLOGICAL EXAM: ICD-10-CM

## 2023-11-08 DIAGNOSIS — N85.2 ENLARGED UTERUS: ICD-10-CM

## 2023-11-08 PROCEDURE — 77063 BREAST TOMOSYNTHESIS BI: CPT

## 2023-11-08 PROCEDURE — 76856 US EXAM PELVIC COMPLETE: CPT

## 2023-11-27 ENCOUNTER — TELEPHONE (OUTPATIENT)
Dept: WOMENS IMAGING | Age: 43
End: 2023-11-27

## 2023-11-29 ENCOUNTER — TELEPHONE (OUTPATIENT)
Dept: WOMENS IMAGING | Age: 43
End: 2023-11-29

## 2023-11-29 NOTE — TELEPHONE ENCOUNTER
Voicemail box full. Calling regarding screening mammogram follow-up.     KIANNA Coulter, CN-BM  Patient 6 [facility]  [phone]

## 2023-12-01 DIAGNOSIS — R92.30 BREAST DENSITY: Primary | ICD-10-CM

## 2023-12-08 ENCOUNTER — TELEPHONE (OUTPATIENT)
Dept: GYNECOLOGY | Age: 43
End: 2023-12-08

## 2023-12-08 NOTE — TELEPHONE ENCOUNTER
Tell patient that I saw the results before the addendum. She does not need a follow-up mammogram or ultrasound anymore. As far as her pelvic ultrasound, she has some small fibroids. We can just watch these with her annual. She needs to call with any increase in pain or bleeding.

## 2023-12-08 NOTE — TELEPHONE ENCOUNTER
Called patient to go over her pelvic US results, then patient asked about her  mammogram results per results notes it says patient a left diagnostic mammogram and left breast ultrasound.   Then another set of notes says her mammogram was normal. Patient says she would like to know why does she need the breast US and etc.     Patient can be contacted at [phone]

## 2024-04-01 DIAGNOSIS — I10 PRIMARY HYPERTENSION: ICD-10-CM

## 2024-04-01 NOTE — TELEPHONE ENCOUNTER
Medication:   Requested Prescriptions     Pending Prescriptions Disp Refills    carvedilol (COREG) 6.25 MG tablet [Pharmacy Med Name: CARVEDILOL 6.25MG TABLETS] 60 tablet 5     Sig: TAKE 1 TABLET BY MOUTH TWICE DAILY    valsartan (DIOVAN) 80 MG tablet [Pharmacy Med Name: VALSARTAN 80MG TABLETS] 60 tablet 5     Sig: TAKE 1 TABLET BY MOUTH TWICE DAILY        Last Filled:  10/13/23, 10/13/23    Last appt: 10/13/2023   Next appt: Visit date not found  Return in 3 months (on 1/13/2024)  Last OARRS:        No data to display

## 2024-04-01 NOTE — TELEPHONE ENCOUNTER
Medication:   Requested Prescriptions     Pending Prescriptions Disp Refills    carvedilol (COREG) 6.25 MG tablet [Pharmacy Med Name: CARVEDILOL 6.25MG TABLETS] 60 tablet 5     Sig: TAKE 1 TABLET BY MOUTH TWICE DAILY    valsartan (DIOVAN) 80 MG tablet [Pharmacy Med Name: VALSARTAN 80MG TABLETS] 60 tablet 5     Sig: TAKE 1 TABLET BY MOUTH TWICE DAILY        Last Filled:  10/13/23, 10/13/23    Last appt: 10/13/2023   Next appt: 4/1/2024    Last OARRS:        No data to display

## 2024-04-08 RX ORDER — CARVEDILOL 6.25 MG/1
6.25 TABLET ORAL 2 TIMES DAILY
Qty: 60 TABLET | Refills: 5 | OUTPATIENT
Start: 2024-04-08

## 2024-04-08 RX ORDER — VALSARTAN 80 MG/1
80 TABLET ORAL 2 TIMES DAILY
Qty: 60 TABLET | Refills: 5 | OUTPATIENT
Start: 2024-04-08

## 2024-07-31 ENCOUNTER — OFFICE VISIT (OUTPATIENT)
Dept: INTERNAL MEDICINE CLINIC | Age: 44
End: 2024-07-31
Payer: COMMERCIAL

## 2024-07-31 VITALS
HEART RATE: 84 BPM | SYSTOLIC BLOOD PRESSURE: 157 MMHG | OXYGEN SATURATION: 100 % | BODY MASS INDEX: 28.47 KG/M2 | WEIGHT: 176.4 LBS | DIASTOLIC BLOOD PRESSURE: 106 MMHG

## 2024-07-31 DIAGNOSIS — I10 PRIMARY HYPERTENSION: ICD-10-CM

## 2024-07-31 DIAGNOSIS — F41.1 GAD (GENERALIZED ANXIETY DISORDER): ICD-10-CM

## 2024-07-31 DIAGNOSIS — Z12.11 COLON CANCER SCREENING: ICD-10-CM

## 2024-07-31 DIAGNOSIS — Z00.00 ENCOUNTER FOR WELL ADULT EXAM WITHOUT ABNORMAL FINDINGS: Primary | ICD-10-CM

## 2024-07-31 DIAGNOSIS — F33.1 MODERATE EPISODE OF RECURRENT MAJOR DEPRESSIVE DISORDER (HCC): ICD-10-CM

## 2024-07-31 PROBLEM — K90.9 MALABSORPTION OF IRON: Status: ACTIVE | Noted: 2017-03-28

## 2024-07-31 PROBLEM — I40.1 ACUTE IDIOPATHIC MYOCARDITIS: Status: ACTIVE | Noted: 2022-04-21

## 2024-07-31 PROBLEM — D50.9 IRON DEFICIENCY ANEMIA: Status: ACTIVE | Noted: 2017-03-24

## 2024-07-31 PROBLEM — R79.89 SERUM CREATININE RAISED: Chronic | Status: ACTIVE | Noted: 2022-04-19

## 2024-07-31 PROCEDURE — 3080F DIAST BP >= 90 MM HG: CPT | Performed by: NURSE PRACTITIONER

## 2024-07-31 PROCEDURE — 99213 OFFICE O/P EST LOW 20 MIN: CPT | Performed by: NURSE PRACTITIONER

## 2024-07-31 PROCEDURE — 99396 PREV VISIT EST AGE 40-64: CPT | Performed by: NURSE PRACTITIONER

## 2024-07-31 PROCEDURE — 3077F SYST BP >= 140 MM HG: CPT | Performed by: NURSE PRACTITIONER

## 2024-07-31 RX ORDER — OMEPRAZOLE 20 MG/1
20 CAPSULE, DELAYED RELEASE ORAL DAILY
Qty: 30 CAPSULE | Refills: 11 | Status: SHIPPED | OUTPATIENT
Start: 2024-07-31

## 2024-07-31 RX ORDER — BUPROPION HYDROCHLORIDE 300 MG/1
300 TABLET ORAL DAILY
Qty: 30 TABLET | Refills: 11 | Status: SHIPPED | OUTPATIENT
Start: 2024-07-31

## 2024-07-31 RX ORDER — ASPIRIN 81 MG/1
81 TABLET ORAL DAILY
Qty: 30 TABLET | Refills: 11 | Status: SHIPPED | OUTPATIENT
Start: 2024-07-31

## 2024-07-31 RX ORDER — FLUOXETINE HYDROCHLORIDE 40 MG/1
40 CAPSULE ORAL DAILY
Qty: 30 CAPSULE | Refills: 11 | Status: SHIPPED | OUTPATIENT
Start: 2024-07-31

## 2024-07-31 RX ORDER — HYDROXYZINE HYDROCHLORIDE 10 MG/1
10 TABLET, FILM COATED ORAL EVERY 4 HOURS PRN
Qty: 30 TABLET | Refills: 5 | Status: SHIPPED | OUTPATIENT
Start: 2024-07-31

## 2024-07-31 RX ORDER — VALSARTAN 80 MG/1
80 TABLET ORAL 2 TIMES DAILY
Qty: 60 TABLET | Refills: 11 | Status: SHIPPED | OUTPATIENT
Start: 2024-07-31

## 2024-07-31 RX ORDER — CARVEDILOL 6.25 MG/1
6.25 TABLET ORAL 2 TIMES DAILY
Qty: 60 TABLET | Refills: 11 | Status: SHIPPED | OUTPATIENT
Start: 2024-07-31

## 2024-07-31 SDOH — ECONOMIC STABILITY: FOOD INSECURITY: WITHIN THE PAST 12 MONTHS, YOU WORRIED THAT YOUR FOOD WOULD RUN OUT BEFORE YOU GOT MONEY TO BUY MORE.: NEVER TRUE

## 2024-07-31 SDOH — ECONOMIC STABILITY: FOOD INSECURITY: WITHIN THE PAST 12 MONTHS, THE FOOD YOU BOUGHT JUST DIDN'T LAST AND YOU DIDN'T HAVE MONEY TO GET MORE.: NEVER TRUE

## 2024-07-31 SDOH — ECONOMIC STABILITY: INCOME INSECURITY: HOW HARD IS IT FOR YOU TO PAY FOR THE VERY BASICS LIKE FOOD, HOUSING, MEDICAL CARE, AND HEATING?: NOT HARD AT ALL

## 2024-07-31 ASSESSMENT — ENCOUNTER SYMPTOMS
RESPIRATORY NEGATIVE: 1
GASTROINTESTINAL NEGATIVE: 1
EYES NEGATIVE: 1

## 2024-07-31 NOTE — PATIENT INSTRUCTIONS
I will send a message or call if your lab work is abnormal.    [facility] Laboratory Locations - No appointment necessary.  ? indicates the location is open Saturdays in addition to Monday through Friday.   Call your preferred location for test preparation, business hours and other information you need.   [facility] accepts all insurances.  CENTRAL  EAST  [place]    ? [place]   [address]   [address]   [place] [address]    Ph: [phone]  [facility]   [address] [place] [address]    Ph: [phone]   ? [place]   [address]    [place] [address]    Ph: [phone]     [facility]   [address] [place] [address]    Ph: [phone] ? [place]   [address] [place] [address]   Ph: [phone]  ? [facility]   [address] [place] [address]    Ph: [phone]      [place]   [address] [facility] [facility] [facility] [place] [address]   Ph: [phone]    NORTH    ? [facility]   [address] [place] [address]    Ph: [phone]  [facility]   [address] [place] [address]   Ph: [phone]  [place]   [address]   [facility], [address]    PH: [phone]    [facility]   [address] [place] [address]    Ph: [phone]  [place]  [address] [place] [address]  Ph: [phone]  [facility]   4[address] [place] [address]    Ph: [phone]

## 2024-07-31 NOTE — PROGRESS NOTES
Well Adult Note  Name: Park Ledesma Today’s Date: 2024   MRN: [id number] Sex: Female   Age: 43 y.o. Ethnicity: Non- / Non    : 1980 Race: Black /       Park Ledesma is here for a well adult exam.       Subjective   History:  Established pt.    Review of Systems   Constitutional: Negative.    HENT: Negative.     Eyes: Negative.    Respiratory: Negative.     Cardiovascular: Negative.         Hypertension-is not taking medication this morning.  Pressure is elevated.  Says she has also been stressed because she is currently in the process of buying a house and has been having issues with the.   Gastrointestinal: Negative.    Endocrine: Negative.    Genitourinary: Negative.    Musculoskeletal: Negative.    Skin: Negative.    Neurological:  Positive for headaches.   Hematological: Negative.    Psychiatric/Behavioral:  Positive for dysphoric mood. The patient is nervous/anxious.        Allergies   Allergen Reactions    Latex Rash    Adhesive Tape Rash    Debby-D [Diphenhydramine] Other (See Comments)     Prior to Visit Medications    Medication Sig Taking? Authorizing Provider   aspirin 81 MG EC tablet Take 1 tablet by mouth daily Yes Lara Dick APRN   buPROPion (WELLBUTRIN XL) 300 MG extended release tablet Take 1 tablet by mouth daily Yes Lara Dick APRN   carvedilol (COREG) 6.25 MG tablet Take 1 tablet by mouth 2 times daily Yes Lara Dick APRN   FLUoxetine (PROZAC) 40 MG capsule Take 1 capsule by mouth daily Yes Lara Dick APRN   hydrOXYzine HCl (ATARAX) 10 MG tablet Take 1 tablet by mouth every 4 hours as needed for Anxiety (sleep) Yes Lara Dick APRN   omeprazole (PRILOSEC) 20 MG delayed release capsule Take 1 capsule by mouth daily Yes Lara Dick APRN   valsartan (DIOVAN) 80 MG tablet Take 1 tablet by mouth 2 times daily Yes Lara Dick APRN   clotrimazole (LOTRIMIN) 1 % cream 1 application to affected nails Externally Twice a day for 28 day(s)

## 2024-08-12 DIAGNOSIS — F33.1 MODERATE EPISODE OF RECURRENT MAJOR DEPRESSIVE DISORDER (HCC): ICD-10-CM

## 2024-08-12 RX ORDER — BUPROPION HYDROCHLORIDE 300 MG/1
300 TABLET ORAL DAILY
Qty: 30 TABLET | Refills: 11 | OUTPATIENT
Start: 2024-08-12

## 2024-08-26 LAB — NONINV COLON CA DNA+OCC BLD SCRN STL QL: NEGATIVE

## 2024-10-29 ENCOUNTER — OFFICE VISIT (OUTPATIENT)
Dept: GYNECOLOGY | Age: 44
End: 2024-10-29
Payer: COMMERCIAL

## 2024-10-29 VITALS
DIASTOLIC BLOOD PRESSURE: 82 MMHG | HEART RATE: 80 BPM | BODY MASS INDEX: 28.34 KG/M2 | RESPIRATION RATE: 17 BRPM | HEIGHT: 66 IN | WEIGHT: 176.37 LBS | SYSTOLIC BLOOD PRESSURE: 150 MMHG | OXYGEN SATURATION: 100 %

## 2024-10-29 DIAGNOSIS — N89.8 VAGINAL DISCHARGE: ICD-10-CM

## 2024-10-29 DIAGNOSIS — Z01.419 WELL WOMAN EXAM WITH ROUTINE GYNECOLOGICAL EXAM: Primary | ICD-10-CM

## 2024-10-29 DIAGNOSIS — R22.2 MASS OF SKIN OF ABDOMEN: ICD-10-CM

## 2024-10-29 DIAGNOSIS — B37.31 YEAST VAGINITIS: ICD-10-CM

## 2024-10-29 PROCEDURE — 99396 PREV VISIT EST AGE 40-64: CPT | Performed by: OBSTETRICS & GYNECOLOGY

## 2024-10-29 PROCEDURE — 3077F SYST BP >= 140 MM HG: CPT | Performed by: OBSTETRICS & GYNECOLOGY

## 2024-10-29 PROCEDURE — 3079F DIAST BP 80-89 MM HG: CPT | Performed by: OBSTETRICS & GYNECOLOGY

## 2024-10-29 RX ORDER — FLUCONAZOLE 150 MG/1
150 TABLET ORAL
Qty: 3 TABLET | Refills: 3 | Status: SHIPPED | OUTPATIENT
Start: 2024-10-29

## 2024-10-29 ASSESSMENT — ENCOUNTER SYMPTOMS
RESPIRATORY NEGATIVE: 1
EYES NEGATIVE: 1
ALLERGIC/IMMUNOLOGIC NEGATIVE: 1
GASTROINTESTINAL NEGATIVE: 1

## 2024-10-30 LAB
BACTERIA GENITAL QL WET PREP: ABNORMAL
CLUE CELLS SPEC QL WET PREP: ABNORMAL
EPI CELLS SPEC QL WET PREP: ABNORMAL
RBC SPEC QL WET PREP: ABNORMAL
SPECIMEN SOURCE FLD: ABNORMAL
T VAGINALIS GENITAL QL WET PREP: ABNORMAL
WBC SPEC QL WET PREP: ABNORMAL
YEAST GENITAL QL WET PREP: ABNORMAL

## 2024-10-30 NOTE — PROGRESS NOTES
Subjective   Patient ID: Park Ledesma is a 44 y.o. female.    Patient is here for annual. Patient with some vaginal discharge. Concerned about cyst between breasts.     Gynecologic Exam        Review of Systems   Constitutional: Negative.    HENT: Negative.     Eyes: Negative.    Respiratory: Negative.     Cardiovascular: Negative.    Gastrointestinal: Negative.    Endocrine: Negative.    Genitourinary: Negative.    Musculoskeletal: Negative.    Skin: Negative.    Allergic/Immunologic: Negative.    Neurological: Negative.    Hematological: Negative.    Psychiatric/Behavioral: Negative.       Date of Birth 1980  Past Medical History:   Diagnosis Date    Anxiety     Depression     Gestational diabetes     Hypertension     Myocarditis (HCC)     Obesity      Past Surgical History:   Procedure Laterality Date    BARIATRIC SURGERY      CARDIAC CATHETERIZATION      ENDOMETRIAL ABLATION      LEEP       OB History    Para Term  AB Living   7 3 3   1     SAB IAB Ectopic Molar Multiple Live Births             3      # Outcome Date GA Lbr Rob/2nd Weight Sex Type Anes PTL Lv   7 Term            6 Term            5 Term            4       Vag-Spont      3       Vag-Spont      2       CS-LTranv      1 AB              Social History     Socioeconomic History    Marital status:      Spouse name: Not on file    Number of children: Not on file    Years of education: Not on file    Highest education level: Not on file   Occupational History    Not on file   Tobacco Use    Smoking status: Never     Passive exposure: Never    Smokeless tobacco: Never   Vaping Use    Vaping status: Never Used   Substance and Sexual Activity    Alcohol use: Never    Drug use: Never    Sexual activity: Not on file   Other Topics Concern    Not on file   Social History Narrative    Not on file     Social Determinants of Health     Financial Resource Strain: Low Risk  (2024)    Overall Financial

## 2024-10-31 RX ORDER — TERCONAZOLE 0.4 %
1 CREAM WITH APPLICATOR VAGINAL NIGHTLY
Qty: 1 EACH | Refills: 1 | Status: SHIPPED | OUTPATIENT
Start: 2024-10-31 | End: 2024-11-07

## 2024-10-31 RX ORDER — TERCONAZOLE 0.4 %
CREAM WITH APPLICATOR VAGINAL
Qty: 1 EACH | Refills: 0 | Status: SHIPPED | OUTPATIENT
Start: 2024-10-31 | End: 2024-10-31

## 2024-10-31 RX ORDER — METRONIDAZOLE 500 MG/1
500 TABLET ORAL 2 TIMES DAILY
Qty: 14 TABLET | Refills: 0 | Status: SHIPPED | OUTPATIENT
Start: 2024-10-31 | End: 2024-11-07

## 2024-10-31 RX ORDER — METRONIDAZOLE 500 MG/1
500 TABLET ORAL 3 TIMES DAILY
Qty: 30 TABLET | Refills: 0 | Status: SHIPPED | OUTPATIENT
Start: 2024-10-31 | End: 2024-10-31

## 2024-11-01 LAB
BACTERIA GENITAL AEROBE CULT: ABNORMAL
BACTERIA GENITAL AEROBE CULT: ABNORMAL
ORGANISM: ABNORMAL

## 2025-03-07 ENCOUNTER — HOSPITAL ENCOUNTER (OUTPATIENT)
Age: 45
Setting detail: OBSERVATION
Discharge: HOME | End: 2025-03-09
Attending: EMERGENCY MEDICINE
Payer: COMMERCIAL

## 2025-03-07 ENCOUNTER — APPOINTMENT (OUTPATIENT)
Dept: GENERAL RADIOLOGY | Age: 45
End: 2025-03-07
Payer: COMMERCIAL

## 2025-03-07 DIAGNOSIS — R79.89 ELEVATED TROPONIN: ICD-10-CM

## 2025-03-07 DIAGNOSIS — R07.9 CHEST PAIN, UNSPECIFIED TYPE: Primary | ICD-10-CM

## 2025-03-07 DIAGNOSIS — I10 HYPERTENSION, UNSPECIFIED TYPE: ICD-10-CM

## 2025-03-07 LAB
ALBUMIN SERPL-MCNC: 4 G/DL (ref 3.4–5)
ALBUMIN/GLOB SERPL: 1.3 {RATIO} (ref 1.1–2.2)
ALP SERPL-CCNC: 87 U/L (ref 40–129)
ALT SERPL-CCNC: 39 U/L (ref 10–40)
ANION GAP SERPL CALCULATED.3IONS-SCNC: 11 MMOL/L (ref 3–16)
AST SERPL-CCNC: 52 U/L (ref 15–37)
BASOPHILS # BLD: 0.1 K/UL (ref 0–0.2)
BASOPHILS NFR BLD: 1.8 %
BILIRUB SERPL-MCNC: 0.4 MG/DL (ref 0–1)
BUN SERPL-MCNC: 14 MG/DL (ref 7–20)
CALCIUM SERPL-MCNC: 8.8 MG/DL (ref 8.3–10.6)
CHLORIDE SERPL-SCNC: 104 MMOL/L (ref 99–110)
CO2 SERPL-SCNC: 22 MMOL/L (ref 21–32)
CREAT SERPL-MCNC: 1.2 MG/DL (ref 0.6–1.1)
DEPRECATED RDW RBC AUTO: 17.9 % (ref 12.4–15.4)
EOSINOPHIL # BLD: 0.1 K/UL (ref 0–0.6)
EOSINOPHIL NFR BLD: 2.8 %
GFR SERPLBLD CREATININE-BSD FMLA CKD-EPI: 56 ML/MIN/{1.73_M2}
GLUCOSE SERPL-MCNC: 107 MG/DL (ref 70–99)
HCT VFR BLD AUTO: 30 % (ref 36–48)
HGB BLD-MCNC: 9.4 G/DL (ref 12–16)
LIPASE SERPL-CCNC: 42 U/L (ref 13–60)
LYMPHOCYTES # BLD: 1.6 K/UL (ref 1–5.1)
LYMPHOCYTES NFR BLD: 50.4 %
MCH RBC QN AUTO: 22.3 PG (ref 26–34)
MCHC RBC AUTO-ENTMCNC: 31.3 G/DL (ref 31–36)
MCV RBC AUTO: 71.4 FL (ref 80–100)
MONOCYTES # BLD: 0.4 K/UL (ref 0–1.3)
MONOCYTES NFR BLD: 12.6 %
NEUTROPHILS # BLD: 1 K/UL (ref 1.7–7.7)
NEUTROPHILS NFR BLD: 32.4 %
NT-PROBNP SERPL-MCNC: 654 PG/ML (ref 0–124)
PLATELET # BLD AUTO: 303 K/UL (ref 135–450)
PMV BLD AUTO: 9.8 FL (ref 5–10.5)
POTASSIUM SERPL-SCNC: 4 MMOL/L (ref 3.5–5.1)
PROT SERPL-MCNC: 7 G/DL (ref 6.4–8.2)
RBC # BLD AUTO: 4.2 M/UL (ref 4–5.2)
SODIUM SERPL-SCNC: 137 MMOL/L (ref 136–145)
TROPONIN, HIGH SENSITIVITY: <6 NG/L (ref 0–14)
WBC # BLD AUTO: 3.1 K/UL (ref 4–11)

## 2025-03-07 PROCEDURE — 84484 ASSAY OF TROPONIN QUANT: CPT

## 2025-03-07 PROCEDURE — 93005 ELECTROCARDIOGRAM TRACING: CPT | Performed by: EMERGENCY MEDICINE

## 2025-03-07 PROCEDURE — 83880 ASSAY OF NATRIURETIC PEPTIDE: CPT

## 2025-03-07 PROCEDURE — 6370000000 HC RX 637 (ALT 250 FOR IP): Performed by: EMERGENCY MEDICINE

## 2025-03-07 PROCEDURE — 99285 EMERGENCY DEPT VISIT HI MDM: CPT

## 2025-03-07 PROCEDURE — 71045 X-RAY EXAM CHEST 1 VIEW: CPT

## 2025-03-07 PROCEDURE — 83690 ASSAY OF LIPASE: CPT

## 2025-03-07 PROCEDURE — 85025 COMPLETE CBC W/AUTO DIFF WBC: CPT

## 2025-03-07 PROCEDURE — 80053 COMPREHEN METABOLIC PANEL: CPT

## 2025-03-07 RX ORDER — NITROGLYCERIN 0.4 MG/1
0.4 TABLET SUBLINGUAL ONCE
Status: COMPLETED | OUTPATIENT
Start: 2025-03-07 | End: 2025-03-08

## 2025-03-07 RX ORDER — ASPIRIN 325 MG
325 TABLET ORAL ONCE
Status: COMPLETED | OUTPATIENT
Start: 2025-03-07 | End: 2025-03-07

## 2025-03-07 RX ADMIN — ASPIRIN 325 MG: 325 TABLET ORAL at 23:26

## 2025-03-07 ASSESSMENT — PAIN DESCRIPTION - LOCATION: LOCATION: CHEST

## 2025-03-07 ASSESSMENT — PAIN - FUNCTIONAL ASSESSMENT: PAIN_FUNCTIONAL_ASSESSMENT: 0-10

## 2025-03-07 ASSESSMENT — PAIN SCALES - GENERAL
PAINLEVEL_OUTOF10: 0
PAINLEVEL_OUTOF10: 9

## 2025-03-08 ENCOUNTER — APPOINTMENT (OUTPATIENT)
Dept: CT IMAGING | Age: 45
End: 2025-03-08
Payer: COMMERCIAL

## 2025-03-08 PROBLEM — R07.9 CHEST PAIN AT REST: Status: ACTIVE | Noted: 2025-03-08

## 2025-03-08 PROBLEM — I21.4 NSTEMI (NON-ST ELEVATED MYOCARDIAL INFARCTION) (HCC): Status: ACTIVE | Noted: 2025-03-08

## 2025-03-08 PROBLEM — I31.9 MYOPERICARDITIS: Status: ACTIVE | Noted: 2025-03-08

## 2025-03-08 PROBLEM — I25.10 CORONARY ARTERY DISEASE INVOLVING NATIVE CORONARY ARTERY OF NATIVE HEART WITHOUT ANGINA PECTORIS: Status: ACTIVE | Noted: 2025-03-08

## 2025-03-08 LAB
ANTI-XA UNFRAC HEPARIN: 0.69 IU/ML (ref 0.3–0.7)
ANTI-XA UNFRAC HEPARIN: 0.9 IU/ML (ref 0.3–0.7)
ANTI-XA UNFRAC HEPARIN: <0.1 IU/ML (ref 0.3–0.7)
APTT BLD: 26.9 SEC (ref 22.1–36.4)
CHOLEST SERPL-MCNC: 247 MG/DL (ref 0–199)
CRP SERPL-MCNC: <3 MG/L (ref 0–5.1)
D-DIMER QUANTITATIVE: 1.21 UG/ML FEU (ref 0–0.6)
EKG ATRIAL RATE: 64 BPM
EKG ATRIAL RATE: 71 BPM
EKG DIAGNOSIS: NORMAL
EKG DIAGNOSIS: NORMAL
EKG P AXIS: 54 DEGREES
EKG P AXIS: 61 DEGREES
EKG P-R INTERVAL: 144 MS
EKG P-R INTERVAL: 164 MS
EKG Q-T INTERVAL: 452 MS
EKG Q-T INTERVAL: 460 MS
EKG QRS DURATION: 86 MS
EKG QRS DURATION: 88 MS
EKG QTC CALCULATION (BAZETT): 474 MS
EKG QTC CALCULATION (BAZETT): 491 MS
EKG R AXIS: 12 DEGREES
EKG R AXIS: 20 DEGREES
EKG T AXIS: -12 DEGREES
EKG T AXIS: -22 DEGREES
EKG VENTRICULAR RATE: 64 BPM
EKG VENTRICULAR RATE: 71 BPM
ERYTHROCYTE [SEDIMENTATION RATE] IN BLOOD BY WESTERGREN METHOD: 46 MM/HR (ref 0–20)
FERRITIN SERPL IA-MCNC: 8.4 NG/ML (ref 15–150)
HDLC SERPL-MCNC: 74 MG/DL (ref 40–60)
IRON SATN MFR SERPL: 3 % (ref 15–50)
IRON SERPL-MCNC: 16 UG/DL (ref 37–145)
LDLC SERPL CALC-MCNC: 163 MG/DL
TIBC SERPL-MCNC: 498 UG/DL (ref 260–445)
TRIGL SERPL-MCNC: 51 MG/DL (ref 0–150)
TROPONIN, HIGH SENSITIVITY: 19 NG/L (ref 0–14)
TROPONIN, HIGH SENSITIVITY: 25 NG/L (ref 0–14)
TROPONIN, HIGH SENSITIVITY: 34 NG/L (ref 0–14)
TROPONIN, HIGH SENSITIVITY: 43 NG/L (ref 0–14)
TSH SERPL DL<=0.005 MIU/L-ACNC: 0.63 UIU/ML (ref 0.27–4.2)
VLDLC SERPL CALC-MCNC: 10 MG/DL

## 2025-03-08 PROCEDURE — 85520 HEPARIN ASSAY: CPT

## 2025-03-08 PROCEDURE — 6370000000 HC RX 637 (ALT 250 FOR IP): Performed by: INTERNAL MEDICINE

## 2025-03-08 PROCEDURE — G0378 HOSPITAL OBSERVATION PER HR: HCPCS

## 2025-03-08 PROCEDURE — 94760 N-INVAS EAR/PLS OXIMETRY 1: CPT

## 2025-03-08 PROCEDURE — 6360000002 HC RX W HCPCS: Performed by: INTERNAL MEDICINE

## 2025-03-08 PROCEDURE — 6360000004 HC RX CONTRAST MEDICATION: Performed by: INTERNAL MEDICINE

## 2025-03-08 PROCEDURE — 71260 CT THORAX DX C+: CPT

## 2025-03-08 PROCEDURE — 84443 ASSAY THYROID STIM HORMONE: CPT

## 2025-03-08 PROCEDURE — 96366 THER/PROPH/DIAG IV INF ADDON: CPT

## 2025-03-08 PROCEDURE — 86140 C-REACTIVE PROTEIN: CPT

## 2025-03-08 PROCEDURE — 6370000000 HC RX 637 (ALT 250 FOR IP)

## 2025-03-08 PROCEDURE — 85730 THROMBOPLASTIN TIME PARTIAL: CPT

## 2025-03-08 PROCEDURE — 6370000000 HC RX 637 (ALT 250 FOR IP): Performed by: EMERGENCY MEDICINE

## 2025-03-08 PROCEDURE — 83540 ASSAY OF IRON: CPT

## 2025-03-08 PROCEDURE — 2500000003 HC RX 250 WO HCPCS

## 2025-03-08 PROCEDURE — 93005 ELECTROCARDIOGRAM TRACING: CPT | Performed by: EMERGENCY MEDICINE

## 2025-03-08 PROCEDURE — 99223 1ST HOSP IP/OBS HIGH 75: CPT | Performed by: INTERNAL MEDICINE

## 2025-03-08 PROCEDURE — 93010 ELECTROCARDIOGRAM REPORT: CPT | Performed by: INTERNAL MEDICINE

## 2025-03-08 PROCEDURE — 96365 THER/PROPH/DIAG IV INF INIT: CPT

## 2025-03-08 PROCEDURE — 84484 ASSAY OF TROPONIN QUANT: CPT

## 2025-03-08 PROCEDURE — 36415 COLL VENOUS BLD VENIPUNCTURE: CPT

## 2025-03-08 PROCEDURE — 85379 FIBRIN DEGRADATION QUANT: CPT

## 2025-03-08 PROCEDURE — 82728 ASSAY OF FERRITIN: CPT

## 2025-03-08 PROCEDURE — 80061 LIPID PANEL: CPT

## 2025-03-08 PROCEDURE — 85652 RBC SED RATE AUTOMATED: CPT

## 2025-03-08 PROCEDURE — 96375 TX/PRO/DX INJ NEW DRUG ADDON: CPT

## 2025-03-08 PROCEDURE — 83550 IRON BINDING TEST: CPT

## 2025-03-08 RX ORDER — SODIUM CHLORIDE 0.9 % (FLUSH) 0.9 %
5-40 SYRINGE (ML) INJECTION PRN
Status: DISCONTINUED | OUTPATIENT
Start: 2025-03-08 | End: 2025-03-09 | Stop reason: HOSPADM

## 2025-03-08 RX ORDER — ENOXAPARIN SODIUM 100 MG/ML
40 INJECTION SUBCUTANEOUS DAILY
Status: DISCONTINUED | OUTPATIENT
Start: 2025-03-08 | End: 2025-03-08

## 2025-03-08 RX ORDER — ONDANSETRON 2 MG/ML
4 INJECTION INTRAMUSCULAR; INTRAVENOUS EVERY 6 HOURS PRN
Status: DISCONTINUED | OUTPATIENT
Start: 2025-03-08 | End: 2025-03-09 | Stop reason: HOSPADM

## 2025-03-08 RX ORDER — HEPARIN SODIUM 10000 [USP'U]/100ML
0-4000 INJECTION, SOLUTION INTRAVENOUS CONTINUOUS
Status: DISCONTINUED | OUTPATIENT
Start: 2025-03-08 | End: 2025-03-09

## 2025-03-08 RX ORDER — ASPIRIN 81 MG/1
81 TABLET ORAL DAILY
Status: DISCONTINUED | OUTPATIENT
Start: 2025-03-08 | End: 2025-03-08

## 2025-03-08 RX ORDER — ONDANSETRON 4 MG/1
4 TABLET, ORALLY DISINTEGRATING ORAL EVERY 8 HOURS PRN
Status: DISCONTINUED | OUTPATIENT
Start: 2025-03-08 | End: 2025-03-09 | Stop reason: HOSPADM

## 2025-03-08 RX ORDER — COLCHICINE 0.6 MG/1
0.6 TABLET ORAL DAILY
Status: DISCONTINUED | OUTPATIENT
Start: 2025-03-08 | End: 2025-03-09 | Stop reason: HOSPADM

## 2025-03-08 RX ORDER — POTASSIUM CHLORIDE 7.45 MG/ML
10 INJECTION INTRAVENOUS PRN
Status: DISCONTINUED | OUTPATIENT
Start: 2025-03-08 | End: 2025-03-09 | Stop reason: HOSPADM

## 2025-03-08 RX ORDER — BUPROPION HYDROCHLORIDE 150 MG/1
300 TABLET ORAL DAILY
Status: DISCONTINUED | OUTPATIENT
Start: 2025-03-08 | End: 2025-03-09 | Stop reason: HOSPADM

## 2025-03-08 RX ORDER — ASPIRIN 81 MG/1
81 TABLET, CHEWABLE ORAL DAILY
Status: DISCONTINUED | OUTPATIENT
Start: 2025-03-09 | End: 2025-03-09 | Stop reason: HOSPADM

## 2025-03-08 RX ORDER — CARVEDILOL 6.25 MG/1
6.25 TABLET ORAL 2 TIMES DAILY WITH MEALS
Status: DISCONTINUED | OUTPATIENT
Start: 2025-03-08 | End: 2025-03-09 | Stop reason: HOSPADM

## 2025-03-08 RX ORDER — IOPAMIDOL 755 MG/ML
75 INJECTION, SOLUTION INTRAVASCULAR
Status: COMPLETED | OUTPATIENT
Start: 2025-03-08 | End: 2025-03-08

## 2025-03-08 RX ORDER — VALSARTAN 80 MG/1
160 TABLET ORAL 2 TIMES DAILY
Status: DISCONTINUED | OUTPATIENT
Start: 2025-03-08 | End: 2025-03-09 | Stop reason: HOSPADM

## 2025-03-08 RX ORDER — HEPARIN SODIUM 1000 [USP'U]/ML
4000 INJECTION, SOLUTION INTRAVENOUS; SUBCUTANEOUS ONCE
Status: COMPLETED | OUTPATIENT
Start: 2025-03-08 | End: 2025-03-08

## 2025-03-08 RX ORDER — SODIUM CHLORIDE 0.9 % (FLUSH) 0.9 %
5-40 SYRINGE (ML) INJECTION EVERY 12 HOURS SCHEDULED
Status: DISCONTINUED | OUTPATIENT
Start: 2025-03-08 | End: 2025-03-09 | Stop reason: HOSPADM

## 2025-03-08 RX ORDER — ACETAMINOPHEN 325 MG/1
650 TABLET ORAL EVERY 6 HOURS PRN
Status: DISCONTINUED | OUTPATIENT
Start: 2025-03-08 | End: 2025-03-09 | Stop reason: HOSPADM

## 2025-03-08 RX ORDER — HYDROXYZINE HYDROCHLORIDE 10 MG/1
10 TABLET, FILM COATED ORAL EVERY 4 HOURS PRN
Status: DISCONTINUED | OUTPATIENT
Start: 2025-03-08 | End: 2025-03-09 | Stop reason: HOSPADM

## 2025-03-08 RX ORDER — SODIUM CHLORIDE 9 MG/ML
INJECTION, SOLUTION INTRAVENOUS PRN
Status: DISCONTINUED | OUTPATIENT
Start: 2025-03-08 | End: 2025-03-09 | Stop reason: HOSPADM

## 2025-03-08 RX ORDER — ATORVASTATIN CALCIUM 40 MG/1
40 TABLET, FILM COATED ORAL NIGHTLY
Status: DISCONTINUED | OUTPATIENT
Start: 2025-03-08 | End: 2025-03-09 | Stop reason: HOSPADM

## 2025-03-08 RX ORDER — MAGNESIUM SULFATE IN WATER 40 MG/ML
2000 INJECTION, SOLUTION INTRAVENOUS PRN
Status: DISCONTINUED | OUTPATIENT
Start: 2025-03-08 | End: 2025-03-09 | Stop reason: HOSPADM

## 2025-03-08 RX ORDER — POTASSIUM CHLORIDE 1500 MG/1
40 TABLET, EXTENDED RELEASE ORAL PRN
Status: DISCONTINUED | OUTPATIENT
Start: 2025-03-08 | End: 2025-03-09 | Stop reason: HOSPADM

## 2025-03-08 RX ORDER — ACETAMINOPHEN 650 MG/1
650 SUPPOSITORY RECTAL EVERY 6 HOURS PRN
Status: DISCONTINUED | OUTPATIENT
Start: 2025-03-08 | End: 2025-03-09 | Stop reason: HOSPADM

## 2025-03-08 RX ORDER — VALSARTAN 80 MG/1
80 TABLET ORAL 2 TIMES DAILY
Status: DISCONTINUED | OUTPATIENT
Start: 2025-03-08 | End: 2025-03-08

## 2025-03-08 RX ORDER — HEPARIN SODIUM 1000 [USP'U]/ML
4000 INJECTION, SOLUTION INTRAVENOUS; SUBCUTANEOUS PRN
Status: DISCONTINUED | OUTPATIENT
Start: 2025-03-08 | End: 2025-03-08

## 2025-03-08 RX ORDER — HEPARIN SODIUM 1000 [USP'U]/ML
2000 INJECTION, SOLUTION INTRAVENOUS; SUBCUTANEOUS PRN
Status: DISCONTINUED | OUTPATIENT
Start: 2025-03-08 | End: 2025-03-08

## 2025-03-08 RX ORDER — PANTOPRAZOLE SODIUM 40 MG/1
40 TABLET, DELAYED RELEASE ORAL
Status: DISCONTINUED | OUTPATIENT
Start: 2025-03-08 | End: 2025-03-09 | Stop reason: HOSPADM

## 2025-03-08 RX ORDER — POLYETHYLENE GLYCOL 3350 17 G/17G
17 POWDER, FOR SOLUTION ORAL DAILY PRN
Status: DISCONTINUED | OUTPATIENT
Start: 2025-03-08 | End: 2025-03-09 | Stop reason: HOSPADM

## 2025-03-08 RX ADMIN — CARVEDILOL 6.25 MG: 6.25 TABLET, FILM COATED ORAL at 10:38

## 2025-03-08 RX ADMIN — NITROGLYCERIN 0.4 MG: 0.4 TABLET SUBLINGUAL at 00:03

## 2025-03-08 RX ADMIN — VALSARTAN 160 MG: 80 TABLET ORAL at 20:49

## 2025-03-08 RX ADMIN — FLUOXETINE HYDROCHLORIDE 40 MG: 20 CAPSULE ORAL at 08:08

## 2025-03-08 RX ADMIN — COLCHICINE 0.6 MG: 0.6 TABLET, FILM COATED ORAL at 10:38

## 2025-03-08 RX ADMIN — BUPROPION HYDROCHLORIDE 300 MG: 150 TABLET, EXTENDED RELEASE ORAL at 08:08

## 2025-03-08 RX ADMIN — PANTOPRAZOLE SODIUM 40 MG: 40 TABLET, DELAYED RELEASE ORAL at 07:29

## 2025-03-08 RX ADMIN — SODIUM CHLORIDE, PRESERVATIVE FREE 10 ML: 5 INJECTION INTRAVENOUS at 08:10

## 2025-03-08 RX ADMIN — SODIUM CHLORIDE, PRESERVATIVE FREE 10 ML: 5 INJECTION INTRAVENOUS at 20:48

## 2025-03-08 RX ADMIN — ATORVASTATIN CALCIUM 40 MG: 40 TABLET, FILM COATED ORAL at 03:11

## 2025-03-08 RX ADMIN — HEPARIN SODIUM 980 UNITS/HR: 10000 INJECTION, SOLUTION INTRAVENOUS at 11:06

## 2025-03-08 RX ADMIN — IRON SUCROSE 200 MG: 20 INJECTION, SOLUTION INTRAVENOUS at 10:38

## 2025-03-08 RX ADMIN — VALSARTAN 80 MG: 80 TABLET ORAL at 08:08

## 2025-03-08 RX ADMIN — ATORVASTATIN CALCIUM 40 MG: 40 TABLET, FILM COATED ORAL at 20:49

## 2025-03-08 RX ADMIN — HEPARIN SODIUM 4000 UNITS: 1000 INJECTION INTRAVENOUS; SUBCUTANEOUS at 11:04

## 2025-03-08 RX ADMIN — CARVEDILOL 6.25 MG: 6.25 TABLET, FILM COATED ORAL at 18:00

## 2025-03-08 RX ADMIN — IOPAMIDOL 75 ML: 755 INJECTION, SOLUTION INTRAVENOUS at 14:47

## 2025-03-08 ASSESSMENT — PAIN - FUNCTIONAL ASSESSMENT: PAIN_FUNCTIONAL_ASSESSMENT: ACTIVITIES ARE NOT PREVENTED

## 2025-03-08 ASSESSMENT — PAIN SCALES - GENERAL
PAINLEVEL_OUTOF10: 4
PAINLEVEL_OUTOF10: 0

## 2025-03-08 ASSESSMENT — PAIN DESCRIPTION - LOCATION: LOCATION: CHEST

## 2025-03-08 ASSESSMENT — PAIN DESCRIPTION - ORIENTATION: ORIENTATION: LEFT

## 2025-03-08 ASSESSMENT — PAIN DESCRIPTION - DESCRIPTORS: DESCRIPTORS: PRESSURE

## 2025-03-08 NOTE — CONSULTS
Referring Physician: Dr. VISH Gomes  Reason for Consultation: \"Chest pain, elevated troponin\"  Chief Complaint: Chest pain    Subjective:   History of Present Illness:  Park Ledesma is a 44 y.o. patient who presented to the hospital with complaints of chest pain.  She reports the sudden onset of left-sided chest pain radiating to her neck and jaw.  She denies any recent exertional symptoms and was feeling in her usual state of health until the onset of the chest pain.  She says this is very similar to the episode that occurred in 2022 when she underwent left heart catheterization and was ultimately diagnosed with myocarditis.  She denies any recent fever/chills/viral illnesses.  She continues to have mild chest pain but states that this is significantly less intense than 2022 and is improving in general.  She notes that recently she had mild lower extremity edema which has resolved.  Her angiogram in 4/2022 reports \"minor nonobstructive coronary artery disease.\"    Past Medical History:   has a past medical history of Anxiety, Depression, Gestational diabetes, Hypertension, Myocarditis (HCC), and Obesity.    Surgical History:   has a past surgical history that includes LEEP; Bariatric Surgery; Endometrial ablation; and Cardiac catheterization.     Social History:   reports that she has never smoked. She has never been exposed to tobacco smoke. She has never used smokeless tobacco. She reports that she does not drink alcohol and does not use drugs.     Family History:  family history includes Diabetes in her mother; Heart Disease in her father; Kidney Disease in her mother; Mental Illness in her mother.    Home Medications:  Were reviewed and are listed in nursing record and/or below  Prior to Admission medications    Medication Sig Start Date End Date Taking? Authorizing Provider   fluconazole (DIFLUCAN) 150 MG tablet Take 1 tablet by mouth every 28 days 10/29/24  Yes Loulou Perez MD   aspirin 81

## 2025-03-08 NOTE — H&P
Meds: hydrOXYzine HCl, 10 mg, Q4H PRN  sodium chloride flush, 5-40 mL, PRN  sodium chloride, , PRN  potassium chloride, 40 mEq, PRN   Or  potassium alternative oral replacement, 40 mEq, PRN   Or  potassium chloride, 10 mEq, PRN  magnesium sulfate, 2,000 mg, PRN  ondansetron, 4 mg, Q8H PRN   Or  ondansetron, 4 mg, Q6H PRN  acetaminophen, 650 mg, Q6H PRN   Or  acetaminophen, 650 mg, Q6H PRN  polyethylene glycol, 17 g, Daily PRN        Labs      CBC:   Recent Labs     03/07/25 2230   WBC 3.1*   HGB 9.4*        BMP:    Recent Labs     03/07/25 2230      K 4.0      CO2 22   BUN 14   CREATININE 1.2*   GLUCOSE 107*     Hepatic:   Recent Labs     03/07/25 2230   AST 52*   ALT 39   BILITOT 0.4   ALKPHOS 87     Lipids:   Lab Results   Component Value Date/Time    CHOL 243 03/10/2023 09:52 AM    HDL 80 03/10/2023 09:52 AM    TRIG 52 03/10/2023 09:52 AM     Hemoglobin A1C:   Lab Results   Component Value Date/Time    LABA1C 5.4 10/26/2023 11:05 AM     TSH: No results found for: \"TSH\"  Troponin: No results found for: \"TROPONINT\"  Lactic Acid: No results for input(s): \"LACTA\" in the last 72 hours.  BNP:   Recent Labs     03/07/25 2230   PROBNP 654*     UA:No results found for: \"NITRU\", \"COLORU\", \"PHUR\", \"LABCAST\", \"WBCUA\", \"RBCUA\", \"MUCUS\", \"TRICHOMONAS\", \"YEAST\", \"BACTERIA\", \"CLARITYU\", \"SPECGRAV\", \"LEUKOCYTESUR\", \"UROBILINOGEN\", \"BILIRUBINUR\", \"BLOODU\", \"GLUCOSEU\", \"KETUA\", \"AMORPHOUS\"  Urine Cultures: No results found for: \"LABURIN\"  Blood Cultures: No results found for: \"BC\"  No results found for: \"BLOODCULT2\"  Organism:   Lab Results   Component Value Date/Time    ORG Zoya tropicalis 10/29/2024 11:45 AM       Imaging/Diagnostics Last 24 Hours   XR CHEST PORTABLE  Result Date: 3/7/2025  EXAMINATION: ONE XRAY VIEW OF THE CHEST 3/7/2025 10:26 pm COMPARISON: None. HISTORY: ORDERING SYSTEM PROVIDED HISTORY: sob TECHNOLOGIST PROVIDED HISTORY: Reason for exam:->sob Reason for Exam: sob FINDINGS: The

## 2025-03-08 NOTE — ED PROVIDER NOTES
EMERGENCY DEPARTMENT ENCOUNTER     [facility] 4W MED SURG     Pt Name: Park Ledesma   MRN: [id number]   Birthdate 1980   Date of evaluation: 3/7/2025   Provider: Bib Rodriguez MD   PCP: Lara Dick APRN   Note Started: 12:46 AM EST 3/8/25     CHIEF COMPLAINT     Chief Complaint   Patient presents with    Chest Pain     Left sided chest pain that started about an hour ago, the pain radiates down left arm and into jaw 7/10        HISTORY OF PRESENT ILLNESS:  History from : Patient        Park Ledesma is a 44 y.o. female who presents for evaluation of chest pain.  Patient reports spontaneous onset of chest pain that began approximate 1 hour prior to arrival.  Reports substernal discomfort with radiation to left arm with associated shortness of breath diaphoresis.  Patient reports a history of idiopathic cardiomyopathy.  She did have a Angiocath performed in April 2022 which was negative for CAD.  States she has been out of her antihypertensive medications.  Patient did take aspirin prior to arrival.     Nursing Notes were all reviewed and agreed with or any disagreements were addressed in the HPI.     ROS: Positives and Pertinent negatives as per HPI.    PAST MEDICAL HISTORY     Past medical history:  has a past medical history of Anxiety, Depression, Gestational diabetes, Hypertension, Myocarditis (HCC), and Obesity.    Past surgical history:  has a past surgical history that includes LEEP; Bariatric Surgery; Endometrial ablation; and Cardiac catheterization.    Med list:   No current facility-administered medications on file prior to encounter.     Current Outpatient Medications on File Prior to Encounter   Medication Sig Dispense Refill    fluconazole (DIFLUCAN) 150 MG tablet Take 1 tablet by mouth every 28 days 3 tablet 3    aspirin 81 MG EC tablet Take 1 tablet by mouth daily 30 tablet 11    buPROPion (WELLBUTRIN XL) 300 MG extended release tablet Take 1 tablet by mouth daily 30 tablet 11    carvedilol

## 2025-03-08 NOTE — ED NOTES
ED TO INPATIENT SBAR HANDOFF    Patient Name: Park Ledesma   Preferred Name: PARK  : 1980  44 y.o.   Family/Caregiver Present: no   Code Status Order: Full Code  PO Status: NPO:No  Telemetry Order: Yes  C-SSRS: Risk of Suicide: No Risk  Sitter no   Restraints:     Sepsis Risk Score      Situation  Chief Complaint   Patient presents with    Chest Pain     Left sided chest pain that started about an hour ago, the pain radiates down left arm and into jaw 710     Brief Description of Patient's Condition: Patient from home with complaints of chest pain; relieved with nitro. Elevated troponin x2. Ambulates without difficulty.   Mental Status: oriented and alert  Arrived from:Home  Imaging:   XR CHEST PORTABLE   Final Result   No acute cardiopulmonary disease.           Abnormal labs:   Abnormal Labs Reviewed   CBC WITH AUTO DIFFERENTIAL - Abnormal; Notable for the following components:       Result Value    WBC 3.1 (*)     Hemoglobin 9.4 (*)     Hematocrit 30.0 (*)     MCV 71.4 (*)     MCH 22.3 (*)     RDW 17.9 (*)     Neutrophils Absolute 1.0 (*)     All other components within normal limits   COMPREHENSIVE METABOLIC PANEL W/ REFLEX TO MG FOR LOW K - Abnormal; Notable for the following components:    Glucose 107 (*)     Creatinine 1.2 (*)     Est, Glom Filt Rate 56 (*)     AST 52 (*)     All other components within normal limits   TROPONIN - Abnormal; Notable for the following components:    Troponin, High Sensitivity 19 (*)     All other components within normal limits   BRAIN NATRIURETIC PEPTIDE - Abnormal; Notable for the following components:    NT Pro- (*)     All other components within normal limits   TROPONIN - Abnormal; Notable for the following components:    Troponin, High Sensitivity 34 (*)     All other components within normal limits       Background  Allergies:   Allergies   Allergen Reactions    Latex Rash    Adhesive Tape Rash    Debby-D [Diphenhydramine] Other (See Comments)

## 2025-03-08 NOTE — PLAN OF CARE
Problem: Chronic Conditions and Co-morbidities  Goal: Patient's chronic conditions and co-morbidity symptoms are monitored and maintained or improved  3/8/2025 0819 by Bryanna Melendez RN  Outcome: Progressing  3/8/2025 0345 by Tamela Funez RN  Outcome: Progressing     Problem: Discharge Planning  Goal: Discharge to home or other facility with appropriate resources  3/8/2025 0819 by Bryanna Melendez RN  Outcome: Progressing  3/8/2025 0345 by Tamela Funez RN  Outcome: Progressing     Problem: Cardiovascular - Adult  Goal: Maintains optimal cardiac output and hemodynamic stability  3/8/2025 0819 by Bryanna Melendez RN  Outcome: Progressing  3/8/2025 0345 by Tamela Funez RN  Outcome: Progressing  Goal: Absence of cardiac dysrhythmias or at baseline  3/8/2025 0819 by Bryanna Melendez RN  Outcome: Progressing  3/8/2025 0345 by Tamela Funez RN  Outcome: Progressing     Problem: Pain  Goal: Verbalizes/displays adequate comfort level or baseline comfort level  3/8/2025 0819 by Bryanna Melendez RN  Outcome: Progressing  3/8/2025 0345 by Tamela Funez RN  Outcome: Progressing

## 2025-03-08 NOTE — PLAN OF CARE
Problem: Chronic Conditions and Co-morbidities  Goal: Patient's chronic conditions and co-morbidity symptoms are monitored and maintained or improved  Outcome: Progressing     Problem: Discharge Planning  Goal: Discharge to home or other facility with appropriate resources  Outcome: Progressing     Problem: Cardiovascular - Adult  Goal: Maintains optimal cardiac output and hemodynamic stability  Outcome: Progressing  Goal: Absence of cardiac dysrhythmias or at baseline  Outcome: Progressing     Problem: Pain  Goal: Verbalizes/displays adequate comfort level or baseline comfort level  Outcome: Progressing

## 2025-03-09 VITALS
RESPIRATION RATE: 16 BRPM | SYSTOLIC BLOOD PRESSURE: 145 MMHG | OXYGEN SATURATION: 100 % | TEMPERATURE: 97.9 F | BODY MASS INDEX: 29.16 KG/M2 | WEIGHT: 181.44 LBS | HEART RATE: 72 BPM | HEIGHT: 66 IN | DIASTOLIC BLOOD PRESSURE: 90 MMHG

## 2025-03-09 DIAGNOSIS — I10 PRIMARY HYPERTENSION: ICD-10-CM

## 2025-03-09 DIAGNOSIS — F33.1 MODERATE EPISODE OF RECURRENT MAJOR DEPRESSIVE DISORDER (HCC): ICD-10-CM

## 2025-03-09 DIAGNOSIS — F41.1 GAD (GENERALIZED ANXIETY DISORDER): ICD-10-CM

## 2025-03-09 PROBLEM — I21.4 NSTEMI (NON-ST ELEVATED MYOCARDIAL INFARCTION) (HCC): Status: RESOLVED | Noted: 2025-03-08 | Resolved: 2025-03-09

## 2025-03-09 LAB
ANION GAP SERPL CALCULATED.3IONS-SCNC: 8 MMOL/L (ref 3–16)
ANTI-XA UNFRAC HEPARIN: 0.9 IU/ML (ref 0.3–0.7)
BUN SERPL-MCNC: 10 MG/DL (ref 7–20)
CALCIUM SERPL-MCNC: 8.6 MG/DL (ref 8.3–10.6)
CHLORIDE SERPL-SCNC: 107 MMOL/L (ref 99–110)
CO2 SERPL-SCNC: 23 MMOL/L (ref 21–32)
CREAT SERPL-MCNC: 1.1 MG/DL (ref 0.6–1.1)
DEPRECATED RDW RBC AUTO: 18.7 % (ref 12.4–15.4)
GFR SERPLBLD CREATININE-BSD FMLA CKD-EPI: 63 ML/MIN/{1.73_M2}
GLUCOSE SERPL-MCNC: 88 MG/DL (ref 70–99)
HCT VFR BLD AUTO: 27.2 % (ref 36–48)
HGB BLD-MCNC: 8.6 G/DL (ref 12–16)
MCH RBC QN AUTO: 22.4 PG (ref 26–34)
MCHC RBC AUTO-ENTMCNC: 31.6 G/DL (ref 31–36)
MCV RBC AUTO: 71 FL (ref 80–100)
PLATELET # BLD AUTO: 274 K/UL (ref 135–450)
PMV BLD AUTO: 10.1 FL (ref 5–10.5)
POTASSIUM SERPL-SCNC: 3.8 MMOL/L (ref 3.5–5.1)
RBC # BLD AUTO: 3.83 M/UL (ref 4–5.2)
SODIUM SERPL-SCNC: 138 MMOL/L (ref 136–145)
TROPONIN, HIGH SENSITIVITY: 16 NG/L (ref 0–14)
WBC # BLD AUTO: 4 K/UL (ref 4–11)

## 2025-03-09 PROCEDURE — 96376 TX/PRO/DX INJ SAME DRUG ADON: CPT

## 2025-03-09 PROCEDURE — 36415 COLL VENOUS BLD VENIPUNCTURE: CPT

## 2025-03-09 PROCEDURE — 84484 ASSAY OF TROPONIN QUANT: CPT

## 2025-03-09 PROCEDURE — 6370000000 HC RX 637 (ALT 250 FOR IP)

## 2025-03-09 PROCEDURE — 85027 COMPLETE CBC AUTOMATED: CPT

## 2025-03-09 PROCEDURE — 85520 HEPARIN ASSAY: CPT

## 2025-03-09 PROCEDURE — 6370000000 HC RX 637 (ALT 250 FOR IP): Performed by: INTERNAL MEDICINE

## 2025-03-09 PROCEDURE — 99233 SBSQ HOSP IP/OBS HIGH 50: CPT | Performed by: INTERNAL MEDICINE

## 2025-03-09 PROCEDURE — 6360000002 HC RX W HCPCS: Performed by: INTERNAL MEDICINE

## 2025-03-09 PROCEDURE — 94760 N-INVAS EAR/PLS OXIMETRY 1: CPT

## 2025-03-09 PROCEDURE — G0378 HOSPITAL OBSERVATION PER HR: HCPCS

## 2025-03-09 PROCEDURE — 80048 BASIC METABOLIC PNL TOTAL CA: CPT

## 2025-03-09 PROCEDURE — 96366 THER/PROPH/DIAG IV INF ADDON: CPT

## 2025-03-09 RX ORDER — VALSARTAN 160 MG/1
160 TABLET ORAL 2 TIMES DAILY
Qty: 180 TABLET | Refills: 3 | Status: SHIPPED | OUTPATIENT
Start: 2025-03-09

## 2025-03-09 RX ORDER — VALSARTAN 160 MG/1
160 TABLET ORAL 2 TIMES DAILY
Qty: 180 TABLET | Refills: 3 | Status: SHIPPED | OUTPATIENT
Start: 2025-03-09 | End: 2025-03-09

## 2025-03-09 RX ORDER — COLCHICINE 0.6 MG/1
0.6 TABLET ORAL DAILY
Qty: 90 TABLET | Refills: 1 | Status: SHIPPED | OUTPATIENT
Start: 2025-03-10 | End: 2025-03-09

## 2025-03-09 RX ORDER — FERROUS SULFATE 325(65) MG
325 TABLET ORAL
Qty: 30 TABLET | Refills: 0 | Status: SHIPPED | OUTPATIENT
Start: 2025-03-09

## 2025-03-09 RX ORDER — COLCHICINE 0.6 MG/1
0.6 TABLET ORAL DAILY
Qty: 90 TABLET | Refills: 1 | Status: SHIPPED | OUTPATIENT
Start: 2025-03-10

## 2025-03-09 RX ADMIN — VALSARTAN 160 MG: 80 TABLET ORAL at 08:28

## 2025-03-09 RX ADMIN — PANTOPRAZOLE SODIUM 40 MG: 40 TABLET, DELAYED RELEASE ORAL at 07:25

## 2025-03-09 RX ADMIN — CARVEDILOL 6.25 MG: 6.25 TABLET, FILM COATED ORAL at 08:29

## 2025-03-09 RX ADMIN — BUPROPION HYDROCHLORIDE 300 MG: 150 TABLET, EXTENDED RELEASE ORAL at 08:29

## 2025-03-09 RX ADMIN — ASPIRIN 81 MG: 81 TABLET, CHEWABLE ORAL at 08:27

## 2025-03-09 RX ADMIN — FLUOXETINE HYDROCHLORIDE 40 MG: 20 CAPSULE ORAL at 08:28

## 2025-03-09 RX ADMIN — IRON SUCROSE 200 MG: 20 INJECTION, SOLUTION INTRAVENOUS at 11:37

## 2025-03-09 RX ADMIN — COLCHICINE 0.6 MG: 0.6 TABLET, FILM COATED ORAL at 08:27

## 2025-03-09 ASSESSMENT — PAIN SCALES - GENERAL: PAINLEVEL_OUTOF10: 0

## 2025-03-09 ASSESSMENT — PAIN DESCRIPTION - ORIENTATION: ORIENTATION: OTHER (COMMENT)

## 2025-03-09 ASSESSMENT — PAIN DESCRIPTION - DESCRIPTORS: DESCRIPTORS: OTHER (COMMENT)

## 2025-03-09 ASSESSMENT — PAIN DESCRIPTION - LOCATION: LOCATION: OTHER (COMMENT)

## 2025-03-09 NOTE — DISCHARGE INSTR - COC
Continuity of Care Form    Patient Name: Park Ledesma   :  1980  MRN:  [id number]    Admit date:  3/7/2025  Discharge date:  ***    Code Status Order: Full Code   Advance Directives:     Admitting Physician:  Kaycee Gomes MD  PCP: Lara Dick APRN    Discharging Nurse: ***  Discharging Hospital Unit/Room#: C0V-9291/4105-01  Discharging Unit Phone Number: ***    Emergency Contact:   Extended Emergency Contact Information  Primary Emergency Contact: Werner Rg  Mobile Phone: [phone]  Relation: Friend  Preferred language: English    Past Surgical History:  Past Surgical History:   Procedure Laterality Date    BARIATRIC SURGERY      CARDIAC CATHETERIZATION      ENDOMETRIAL ABLATION      LEEP         Immunization History:   Immunization History   Administered Date(s) Administered    Influenza, FLUBLOK, (age 18 y+), Quadv PF, 0.5mL 2023       Active Problems:  Patient Active Problem List   Diagnosis Code    Uncontrolled hypertension I10    Hx of bariatric surgery Z98.84    Acute idiopathic myocarditis I40.1    Iron deficiency anemia D50.9    Malabsorption of iron 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.9    Serum creatinine raised R79.89    Chest pain at rest R07.9    Myopericarditis I31.9    Coronary artery disease involving native coronary artery of native heart without angina pectoris I25.10       Isolation/Infection:   Isolation            No Isolation          Patient Infection Status    None to display         Nurse Assessment:  Last Vital Signs: BP (!) 145/90   Pulse 72   Temp 97.9 °F (36.6 °C) (Oral)   Resp 16   Ht 1.676 m (5' 6\")   Wt 82.3 kg (181 lb 7 oz)   SpO2 100%   BMI 29.28 kg/m²     Last documented pain score (0-10 scale): Pain Level: 0  Last Weight:   Wt Readings from Last 1 Encounters:   25 82.3 kg (181 lb 7 oz)     Mental Status:  {IP PT MENTAL STATUS:}    IV Access:  { NALDO IV ACCESS:756089864}    Nursing Mobility/ADLs:  Walking   {[facility] ADLs:745619489}  Transfer  {[facility]

## 2025-03-09 NOTE — DISCHARGE SUMMARY
defect to suggest pulmonary embolism.  Main pulmonary artery is normal in caliber. Mediastinum: No evidence of mediastinal lymphadenopathy.  The heart and pericardium demonstrate no acute abnormality.  There is no acute abnormality of the thoracic aorta.  Small fixed hiatus hernia.  Moderate amount of fluid in the esophagus. Lungs/pleura: The lungs are without acute process.  No focal consolidation or pulmonary edema.  No evidence of pleural effusion or pneumothorax. Upper Abdomen: Limited images of the upper abdomen are unremarkable. Soft Tissues/Bones: No acute bone or soft tissue abnormality.     No evidence of pulmonary embolism or acute pulmonary abnormality.     XR CHEST PORTABLE  Result Date: 3/7/2025  EXAMINATION: ONE XRAY VIEW OF THE CHEST 3/7/2025 10:26 pm COMPARISON: None. HISTORY: ORDERING SYSTEM PROVIDED HISTORY: sob TECHNOLOGIST PROVIDED HISTORY: Reason for exam:->sob Reason for Exam: sob FINDINGS: The cardiomediastinal silhouette is of normal size.  The lungs are grossly clear.  There is no pleural effusion.  There is no pneumothorax. There is no acute osseous abnormality.     No acute cardiopulmonary disease.       Other Significant Diagnostic Studies: As described above    Treatments: As described above    Disposition: home    Discharge Medications:       Medication List        START taking these medications      colchicine 0.6 MG tablet  Commonly known as: COLCRYS  Take 1 tablet by mouth daily  Start taking on: March 10, 2025     ferrous sulfate 325 (65 Fe) MG tablet  Commonly known as: IRON 325  Take 1 tablet by mouth daily (with breakfast)            CHANGE how you take these medications      valsartan 160 MG tablet  Commonly known as: DIOVAN  Take 1 tablet by mouth 2 times daily  What changed:   medication strength  how much to take            CONTINUE taking these medications      aspirin 81 MG EC tablet  Take 1 tablet by mouth daily     Blood Pressure Monitor/Arm Karuna  1 each by Does not apply

## 2025-03-09 NOTE — PLAN OF CARE
Problem: Chronic Conditions and Co-morbidities  Goal: Patient's chronic conditions and co-morbidity symptoms are monitored and maintained or improved  Outcome: Progressing     Problem: Discharge Planning  Goal: Discharge to home or other facility with appropriate resources  Outcome: Progressing     Problem: Cardiovascular - Adult  Goal: Maintains optimal cardiac output and hemodynamic stability  Outcome: Progressing  Goal: Absence of cardiac dysrhythmias or at baseline  Outcome: Progressing     Problem: Pain  Goal: Verbalizes/displays adequate comfort level or baseline comfort level  Outcome: Progressing     Problem: Safety - Adult  Goal: Free from fall injury  Outcome: Progressing

## 2025-03-09 NOTE — CARE COORDINATION
Discharge Planning:      (CM) reviewed the patient's chart to assess needs. Patient's Readmission Risk Score is OBS  . Patient's medical insurance is  Pay[place]: [place] BCBS / Plan: [place] / Product Type: *No Product type* / .  Patient's PCP is Lara Dick APRN .  No needs anticipated, at this time. CM team to follow. Staff to inform CM if additional discharge needs arise.    Pts preferred pharmacy is   [facility] #[id number] [place], [place] - [address] - P [phone] - F [phone]  [address] [facility] [address] [facility] [address]  Phone: [phone] Fax: [phone]    Electronically signed by Desirae Cheema RN on 3/9/2025 at 12:41 PM

## 2025-03-09 NOTE — PLAN OF CARE
Problem: Chronic Conditions and Co-morbidities  Goal: Patient's chronic conditions and co-morbidity symptoms are monitored and maintained or improved  3/9/2025 0834 by Roberto Suggs RN  Outcome: Progressing  3/9/2025 0008 by Tamela Funez RN  Outcome: Progressing     Problem: Discharge Planning  Goal: Discharge to home or other facility with appropriate resources  3/9/2025 0834 by Roberto Suggs RN  Outcome: Progressing  3/9/2025 0008 by Tamela Funez RN  Outcome: Progressing     Problem: Cardiovascular - Adult  Goal: Maintains optimal cardiac output and hemodynamic stability  3/9/2025 0834 by Roberto Suggs RN  Outcome: Progressing  3/9/2025 0008 by Tamela Funez RN  Outcome: Progressing  Goal: Absence of cardiac dysrhythmias or at baseline  3/9/2025 0834 by Roberto Suggs RN  Outcome: Progressing  3/9/2025 0008 by Tamela Funez RN  Outcome: Progressing     Problem: Pain  Goal: Verbalizes/displays adequate comfort level or baseline comfort level  3/9/2025 0834 by Roberto Suggs RN  Outcome: Progressing  3/9/2025 0008 by Tamela Funez RN  Outcome: Progressing     Problem: Safety - Adult  Goal: Free from fall injury  3/9/2025 0834 by Roberto Suggs RN  Outcome: Progressing  3/9/2025 0008 by Tamela Funez RN  Outcome: Progressing

## 2025-03-09 NOTE — PROGRESS NOTES
Referring Physician: Dr. VISH Gomes  Reason for Consultation: \"Chest pain, elevated troponin\"  Chief Complaint: Chest pain    Subjective:   History of Present Illness:  Park Ledesma is a 44 y.o. patient who presented to the hospital with complaints of chest pain.  She reports the sudden onset of left-sided chest pain radiating to her neck and jaw.  She denies any recent exertional symptoms and was feeling in her usual state of health until the onset of the chest pain.  She says this is very similar to the episode that occurred in 2022 when she underwent left heart catheterization and was ultimately diagnosed with myocarditis.  She denies any recent fever/chills/viral illnesses.  She continues to have mild chest pain but states that this is significantly less intense than 2022 and is improving in general.  She notes that recently she had mild lower extremity edema which has resolved.  Her angiogram in 4/2022 reports \"minor nonobstructive coronary artery disease.\"    Interval history:  No acute overnight cardiac events.  She states the pain resolved yesterday afternoon.  She is presently chest pain-free.  She denies shortness of breath, lightheadedness, or palpitations.  Family members are present over the phone and in person.  Patient is comfortable with being discharged home today.    Past Medical History:   has a past medical history of Anxiety, Depression, Gestational diabetes, Hypertension, Myocarditis (HCC), and Obesity.    Surgical History:   has a past surgical history that includes LEEP; Bariatric Surgery; Endometrial ablation; and Cardiac catheterization.     Social History:   reports that she has never smoked. She has never been exposed to tobacco smoke. She has never used smokeless tobacco. She reports that she does not drink alcohol and does not use drugs.     Family History:  family history includes Diabetes in her mother; Heart Disease in her father; Kidney Disease in her mother; Mental 
4 Eyes Skin Assessment     NAME:  Park Ledesma  YOB: 1980  MEDICAL RECORD NUMBER:  [id number]    The patient is being assessed for  Admission    I agree that at least one RN has performed a thorough Head to Toe Skin Assessment on the patient. ALL assessment sites listed below have been assessed.      Areas assessed by both nurses:    Head, Face, Ears, Shoulders, Back, Chest, Arms, Elbows, Hands, Sacrum. Buttock, Coccyx, Ischium, Legs. Feet and Heels, and Under Medical Devices         Does the Patient have a Wound? No noted wound(s)       Kyaw Prevention initiated by RN: No  Wound Care Orders initiated by RN: No    Pressure Injury (Stage 3,4, Unstageable, DTI, NWPT, and Complex wounds) if present, place Wound referral order by RN under : No    New Ostomies, if present place, Ostomy referral order under : No     Nurse 1 eSignature: Electronically signed by JOSE NUNEZ RN on 3/8/25 at 3:38 AM EST    **SHARE this note so that the co-signing nurse can place an eSignature**    Nurse 2 eSignature: Electronically signed by Loulou Sheets RN on 3/8/25 at 3:39 AM EST    
Clinical Pharmacy Note  Heparin Dosing       Lab Results   Component Value Date/Time    ANTIXAUHEP 0.69 03/08/2025 04:33 PM      Lab Results   Component Value Date/Time    HGB 9.4 03/07/2025 10:30 PM    HCT 30.0 03/07/2025 10:30 PM     03/07/2025 10:30 PM       Current Infusion Rate: 980 units/hr    Plan:  Rate: 980 units/hr  Next anti-Xa level: 2300 3/8/25    Pharmacy will continue to monitor and adjust based on anti-Xa results.   
Clinical Pharmacy Note  Heparin Dosing       Lab Results   Component Value Date/Time    ANTIXAUHEP 0.90 03/08/2025 10:19 PM      Lab Results   Component Value Date/Time    HGB 9.4 03/07/2025 10:30 PM    HCT 30.0 03/07/2025 10:30 PM     03/07/2025 10:30 PM       Current Infusion Rate: 980 units/hr    Plan:  Rate: decrease to 820 units/hr  Next anti-Xa level: 0600 3/9/25    Pharmacy will continue to monitor and adjust based on anti-Xa results.   Sawyer Lord, PharmD  
Clinical Pharmacy Note  Heparin Dosing       Lab Results   Component Value Date/Time    ANTIXAUHEP 0.90 03/09/2025 06:39 AM      Lab Results   Component Value Date/Time    HGB 8.6 03/09/2025 06:08 AM    HCT 27.2 03/09/2025 06:08 AM     03/09/2025 06:08 AM       Current Infusion Rate: 820 units/hr    Plan:  Bolus:   Rate: decrease to 660 units/hr  Next anti-Xa level: 1400 3-9    Pharmacy will continue to monitor and adjust based on anti-Xa results.    Amadou Lara [facility], 3/9/2025 7:39 AM    
Clinical Pharmacy Note  Heparin Dosing Consult    Park Ledesma is a 44 y.o. female ordered heparin per CAD/STEMI/NSTEMI/UA/AFIB nomogram by Dr. Garcia.     Lab Results   Component Value Date/Time    ANTIXAUHEP <0.10 03/08/2025 10:08 AM      Lab Results   Component Value Date/Time    HGB 9.4 03/07/2025 10:30 PM    HCT 30.0 03/07/2025 10:30 PM     03/07/2025 10:30 PM       Ht Readings from Last 1 Encounters:   03/07/25 1.676 m (5' 6\")        Wt Readings from Last 1 Encounters:   03/08/25 82.1 kg (181 lb)        Assessment/Plan:  Initial bolus: 4000 units  Initial infusion rate: 980 units/hr  Next anti-Xa:: 1700 3-8    Pharmacy will continue to monitor adjust heparin based on anti-Xa results using nomogram below:     CAD/STEMI/NSTEMI/UA/AFIB Heparin Nomogram     Initial Bolus: 60 units/kg Max Bolus: 4,000 units       Initial Rate: 12 units/kg/hr Max Initial Rate: 1,000 units/hr     anti-Xa Bolus Titration   < 0.1 Heparin 60 units/kg bolus Increase drip by 4 units/kg/hr   0.1 - 0.29 Heparin 30 units/kg bolus Increase drip by 2 units/kg/hr   0.3 - 0.7 No Bolus No Change   0.71 - 0.8 No Bolus Decrease drip by 1 units/kg/hr   0.81 - 0.99 No Bolus Decrease drip by 2 units/kg/hr   > 1 Hold Heparin for 1 hour Decrease drip by 3 units/kg/hr     Obtain anti-Xa 6 hours after initial bolus and 6 hours after any dose change until two consecutive therapeutic anti-Xa levels are achieved - then daily.  Amadou Lara [facility], 3/8/2025 11:22 AM    
Patient Discharged. Discharged instructions provided; Denies further need at the time Discharge, patient verbalized understanding. IV removed without complications. Patient ambulate with family members down to her ride to home.  
Patient admitted to room from ED.  Oriented to room, call light, and floor policies.  Plan of care reviewed with patient.  Pt is resting in bed and no pain at this time. Assessment completed; Vitals as charted. Tele in place reading sinus  rhythm. Safety precautions in place; call light and bedside table within reach.  Pt encouraged to call for needs or ambulation.  Pt VU.  Care on going.   
Troponin trending upwards. Last result at 43. MD Sunita Bergeron notified via secure message. Patient denies any chest pain at the moment. NPO for stress test this morning.  Care on going.  
Pt will discharge to: Home without services    Dispo - Anticipated discharge date 2+ days    Hank Marmolejo MD

## 2025-03-10 RX ORDER — FLUOXETINE HYDROCHLORIDE 40 MG/1
40 CAPSULE ORAL DAILY
Qty: 30 CAPSULE | Refills: 3 | Status: SHIPPED | OUTPATIENT
Start: 2025-03-10

## 2025-03-10 RX ORDER — CARVEDILOL 6.25 MG/1
6.25 TABLET ORAL 2 TIMES DAILY
Qty: 60 TABLET | Refills: 3 | Status: SHIPPED | OUTPATIENT
Start: 2025-03-10

## 2025-03-10 RX ORDER — OMEPRAZOLE 20 MG/1
20 CAPSULE, DELAYED RELEASE ORAL DAILY
Qty: 30 CAPSULE | Refills: 3 | Status: SHIPPED | OUTPATIENT
Start: 2025-03-10

## 2025-03-10 RX ORDER — BUPROPION HYDROCHLORIDE 300 MG/1
300 TABLET ORAL DAILY
Qty: 30 TABLET | Refills: 3 | Status: SHIPPED | OUTPATIENT
Start: 2025-03-10

## 2025-03-10 NOTE — TELEPHONE ENCOUNTER
Medication:   Requested Prescriptions     Pending Prescriptions Disp Refills    buPROPion (WELLBUTRIN XL) 300 MG extended release tablet [Pharmacy Med Name: BUPROPION XL 300MG TABLETS] 30 tablet 11     Sig: TAKE 1 TABLET BY MOUTH DAILY    carvedilol (COREG) 6.25 MG tablet [Pharmacy Med Name: CARVEDILOL 6.25MG TABLETS] 60 tablet 11     Sig: TAKE 1 TABLET BY MOUTH TWICE DAILY    FLUoxetine (PROZAC) 40 MG capsule [Pharmacy Med Name: FLUOXETINE 40MG CAPSULES] 30 capsule 11     Sig: TAKE 1 CAPSULE BY MOUTH DAILY    omeprazole (PRILOSEC) 20 MG delayed release capsule [Pharmacy Med Name: OMEPRAZOLE 20MG CAPSULES] 30 capsule 11     Sig: TAKE 1 CAPSULE BY MOUTH DAILY        Last Filled:      Patient Phone Number: [phone] (home) [phone] (work)    Last appt: 7/31/2024   Next appt: Visit date not found    Last OARRS:        No data to display

## 2025-03-12 ENCOUNTER — TELEPHONE (OUTPATIENT)
Dept: CARDIOLOGY CLINIC | Age: 45
End: 2025-03-12

## 2025-03-12 DIAGNOSIS — I31.9 MYOPERICARDITIS: Primary | ICD-10-CM

## 2025-03-12 DIAGNOSIS — I25.10 CORONARY ARTERY DISEASE INVOLVING NATIVE CORONARY ARTERY OF NATIVE HEART WITHOUT ANGINA PECTORIS: ICD-10-CM

## 2025-03-12 NOTE — TELEPHONE ENCOUNTER
Tried to call Rupa, back no answer LVM with all Info for Test     [facility] 03/27/2025 arrive by 9:30 test time 10:00   ** NO CAFFEINE FOR 12 HOURS BEFORE TEST**

## 2025-03-12 NOTE — TELEPHONE ENCOUNTER
Called scheduling [facility] said they can not do stat on this that they are booked out until May  Called [facility] the first opening they have per Stacy is 03/27/2025 arriving at 9:30 for a 10:00 test time.     Is this ok

## 2025-03-12 NOTE — TELEPHONE ENCOUNTER
Per Dr. Garcia, patient needs cardiac MRI ASAP. Placed STAT order - please assist patient with scheduling and schedule a follow up with Dr. Garcia after MRI. Thank you.

## 2025-03-12 NOTE — TELEPHONE ENCOUNTER
Park, called in she was seen by Dr. Garcia in the hospital and he was wanting a Cardiac MRI done But, there is no order in.   Please assist     Park, can be reached at [phone]

## 2025-03-13 ENCOUNTER — TELEPHONE (OUTPATIENT)
Dept: INTERNAL MEDICINE CLINIC | Age: 45
End: 2025-03-13

## 2025-03-13 NOTE — TELEPHONE ENCOUNTER
Care Transitions Initial Follow Up Call    Outreach made within 2 business days of discharge: Yes    Patient: Park Ledesma Patient : 1980   MRN: [id number]  Reason for Admission: yes   Discharge Date: 3/9/25       Spoke with: no one left message     Discharge department/facility: [facility] Patient Contact:  Was patient able to fill all prescriptions: {yes no:662989}  Was patient instructed to bring all medications to the follow-up visit: {yes no:852739}  Is patient taking all medications as directed in the discharge summary? {YES / NO:67786}  Does patient understand their discharge instructions: {yes no:197247}  Does patient have questions or concerns that need addressed prior to 7-14 day follow up office visit: {YES***/NO:60}    Additional needs identified to be addressed with provider  {Yellowbox:57443}             Scheduled appointment with PCP within 7-14 days    Follow Up  Future Appointments   Date Time Provider Department Center   3/27/2025 10:00 AM [facility] 1 [facility]   3/31/2025  8:30 AM Fritz Garcia MD [facility]   2025 10:10 AM [facility]       Ava Shen

## 2025-04-21 ENCOUNTER — HOSPITAL ENCOUNTER (OUTPATIENT)
Dept: MRI IMAGING | Age: 45
Discharge: HOME | End: 2025-03-27
Attending: INTERNAL MEDICINE

## 2025-04-21 DIAGNOSIS — I25.10 CORONARY ARTERY DISEASE INVOLVING NATIVE CORONARY ARTERY OF NATIVE HEART WITHOUT ANGINA PECTORIS: ICD-10-CM

## 2025-04-21 DIAGNOSIS — I31.9 MYOPERICARDITIS: ICD-10-CM

## 2025-05-05 ENCOUNTER — TELEMEDICINE (OUTPATIENT)
Dept: INTERNAL MEDICINE CLINIC | Age: 45
End: 2025-05-05
Payer: COMMERCIAL

## 2025-05-05 DIAGNOSIS — D50.9 IRON DEFICIENCY ANEMIA, UNSPECIFIED IRON DEFICIENCY ANEMIA TYPE: ICD-10-CM

## 2025-05-05 DIAGNOSIS — F41.1 GAD (GENERALIZED ANXIETY DISORDER): ICD-10-CM

## 2025-05-05 DIAGNOSIS — F33.1 MODERATE EPISODE OF RECURRENT MAJOR DEPRESSIVE DISORDER (HCC): ICD-10-CM

## 2025-05-05 DIAGNOSIS — I10 PRIMARY HYPERTENSION: ICD-10-CM

## 2025-05-05 DIAGNOSIS — R07.89 OTHER CHEST PAIN: Primary | ICD-10-CM

## 2025-05-05 PROCEDURE — 99214 OFFICE O/P EST MOD 30 MIN: CPT | Performed by: NURSE PRACTITIONER

## 2025-05-05 RX ORDER — BUPROPION HYDROCHLORIDE 300 MG/1
300 TABLET ORAL DAILY
Qty: 90 TABLET | Refills: 3 | Status: SHIPPED | OUTPATIENT
Start: 2025-05-05

## 2025-05-05 RX ORDER — ASPIRIN 81 MG/1
81 TABLET ORAL DAILY
Qty: 90 TABLET | Refills: 3 | Status: SHIPPED | OUTPATIENT
Start: 2025-05-05

## 2025-05-05 RX ORDER — VALSARTAN 160 MG/1
160 TABLET ORAL 2 TIMES DAILY
Qty: 180 TABLET | Refills: 3 | Status: SHIPPED | OUTPATIENT
Start: 2025-05-05

## 2025-05-05 RX ORDER — HYDROXYZINE HYDROCHLORIDE 10 MG/1
10 TABLET, FILM COATED ORAL EVERY 4 HOURS PRN
Qty: 180 TABLET | Refills: 3 | Status: SHIPPED | OUTPATIENT
Start: 2025-05-05

## 2025-05-05 RX ORDER — CARVEDILOL 6.25 MG/1
6.25 TABLET ORAL 2 TIMES DAILY
Qty: 180 TABLET | Refills: 3 | Status: SHIPPED | OUTPATIENT
Start: 2025-05-05

## 2025-05-05 RX ORDER — VALSARTAN 160 MG/1
160 TABLET ORAL 2 TIMES DAILY
Qty: 14 TABLET | Refills: 0 | Status: SHIPPED | OUTPATIENT
Start: 2025-05-05 | End: 2025-05-12

## 2025-05-05 RX ORDER — OMEPRAZOLE 20 MG/1
20 CAPSULE, DELAYED RELEASE ORAL DAILY
Qty: 90 CAPSULE | Refills: 3 | Status: SHIPPED | OUTPATIENT
Start: 2025-05-05

## 2025-05-05 RX ORDER — FLUOXETINE HYDROCHLORIDE 40 MG/1
40 CAPSULE ORAL DAILY
Qty: 90 CAPSULE | Refills: 3 | Status: SHIPPED | OUTPATIENT
Start: 2025-05-05

## 2025-05-05 SDOH — ECONOMIC STABILITY: FOOD INSECURITY: WITHIN THE PAST 12 MONTHS, THE FOOD YOU BOUGHT JUST DIDN'T LAST AND YOU DIDN'T HAVE MONEY TO GET MORE.: NEVER TRUE

## 2025-05-05 SDOH — ECONOMIC STABILITY: FOOD INSECURITY: WITHIN THE PAST 12 MONTHS, YOU WORRIED THAT YOUR FOOD WOULD RUN OUT BEFORE YOU GOT MONEY TO BUY MORE.: NEVER TRUE

## 2025-05-05 ASSESSMENT — PATIENT HEALTH QUESTIONNAIRE - PHQ9
1. LITTLE INTEREST OR PLEASURE IN DOING THINGS: NOT AT ALL
SUM OF ALL RESPONSES TO PHQ QUESTIONS 1-9: 0
2. FEELING DOWN, DEPRESSED OR HOPELESS: NOT AT ALL
SUM OF ALL RESPONSES TO PHQ QUESTIONS 1-9: 0

## 2025-05-05 ASSESSMENT — ENCOUNTER SYMPTOMS
RESPIRATORY NEGATIVE: 1
GASTROINTESTINAL NEGATIVE: 1

## 2025-05-05 NOTE — PROGRESS NOTES
Park Ledesma, was evaluated through a synchronous (real-time) audio-video encounter. The patient (or guardian if applicable) is aware that this is a billable service, which includes applicable co-pays. This Virtual Visit was conducted with patient's (and/or legal guardian's) consent. Patient identification was verified, and a caregiver was present when appropriate.   The patient was located at Home: [address] [place] [id number]  Provider was located at Facility (Appt Dept): [address] [place] [address]  [address]  [place] [address]  Confirm you are appropriately licensed, registered, or certified to deliver care in the state where the patient is located as indicated above. If you are not or unsure, please re-schedule the visit: Yes, I confirm.     Park Ledesma (:  1980) is a Established patient, presenting virtually for evaluation of the following:      Below is the assessment and plan developed based on review of pertinent history, physical exam, labs, studies, and medications.     Assessment & Plan  Other chest pain   New, at goal (stable), recommend follow-up with cardiology, continue current medication         Primary hypertension   Chronic, not at goal (unstable), continue current plan pending work up below    Orders:    carvedilol (COREG) 6.25 MG tablet; Take 1 tablet by mouth 2 times daily    valsartan (DIOVAN) 160 MG tablet; Take 1 tablet by mouth 2 times daily    valsartan (DIOVAN) 160 MG tablet; Take 1 tablet by mouth 2 times daily for 7 days    Moderate episode of recurrent major depressive disorder (HCC)   Chronic, at goal (stable), continue current treatment plan    Orders:    buPROPion (WELLBUTRIN XL) 300 MG extended release tablet; Take 1 tablet by mouth daily    FLUoxetine (PROZAC) 40 MG capsule; Take 1 capsule by mouth daily    RAVEN (generalized anxiety disorder)   Chronic, at goal (stable), continue current treatment plan    Orders:    FLUoxetine (PROZAC) 40 MG capsule; Take

## 2025-05-05 NOTE — PATIENT INSTRUCTIONS
[facility] Locations - No appointment necessary.  ? indicates the location is open Saturdays in addition to Monday through Friday.   Call your preferred location for test preparation, business hours and other information you need.   [facility] accepts all insurances.  [facility]  [place]    ? [place]   [address]   [address]   [place] [address]    Ph: [phone]  [facility]   [address] [place] [address]    Ph: [phone]   ? [place]   [address]    [place] [address]    Ph: [phone]     [facility]   [address] [place] [address]    Ph: [phone] ? [place]   [address] [place] [address]   Ph: [phone]  ? [facility]   [address] [place] [address]    Ph: [phone]      [place]   [address] [place] [address]   Ph: [phone]     [facility]  [address] [place] [address]   Ph: [phone]    [place]    ? [facility]   [address] [place] [address]    Ph: [phone]  [facility]   [address] [place] [address]   Ph: [phone]  [place]   [address] [place], [place] [address].   [facility], [address]    PH: [phone]    [facility]   [address] [place] [address]    Ph: [phone]  [place]  [address] [place] [address]  Ph: [phone]  [facility]   [address] [place] [address]    Ph: [phone]

## 2025-05-05 NOTE — ASSESSMENT & PLAN NOTE
Chronic, not at goal (unstable), recommend taking iron supplement or focusing on eating iron rich foods    Orders:    CBC with Auto Differential; Future    Iron and TIBC; Future    Ferritin; Future    Reticulocytes; Future

## 2025-06-03 DIAGNOSIS — I10 PRIMARY HYPERTENSION: ICD-10-CM

## 2025-06-08 DIAGNOSIS — I10 PRIMARY HYPERTENSION: ICD-10-CM

## 2025-06-09 RX ORDER — VALSARTAN 160 MG/1
160 TABLET ORAL 2 TIMES DAILY
Qty: 180 TABLET | Refills: 3 | Status: SHIPPED | OUTPATIENT
Start: 2025-06-09

## 2025-06-09 NOTE — TELEPHONE ENCOUNTER
Medication:   Requested Prescriptions     Pending Prescriptions Disp Refills    valsartan (DIOVAN) 160 MG tablet 180 tablet 3     Sig: Take 1 tablet by mouth 2 times daily        Last Filled:      Patient Phone Number: [phone] (home) [phone] (work)    Last appt: 5/5/2025   Next appt: Visit date not found    Last OARRS:        No data to display

## (undated) DEVICE — GLIDESHEATH SLENDER STAINLESS STEEL KIT: Brand: GLIDESHEATH SLENDER

## (undated) DEVICE — TR BAND RADIAL ARTERY COMPRESSION DEVICE: Brand: TR BAND

## (undated) DEVICE — GW EMR FIX EXCHG J STD .035 3MM 260CM

## (undated) DEVICE — GW INQWIRE FC PTFE STD J/1.5 .035 260

## (undated) DEVICE — RADIFOCUS OPTITORQUE ANGIOGRAPHIC CATHETER: Brand: OPTITORQUE

## (undated) DEVICE — CVR PROB ULTRASND GLS STRL

## (undated) DEVICE — CATH F6 ST JR 4 100CM: Brand: SUPERTORQUE